# Patient Record
Sex: MALE | Race: WHITE | NOT HISPANIC OR LATINO | Employment: OTHER | URBAN - METROPOLITAN AREA
[De-identification: names, ages, dates, MRNs, and addresses within clinical notes are randomized per-mention and may not be internally consistent; named-entity substitution may affect disease eponyms.]

---

## 2017-02-14 ENCOUNTER — TELEPHONE (OUTPATIENT)
Dept: TRANSPLANT | Facility: CLINIC | Age: 57
End: 2017-02-14

## 2017-02-14 NOTE — TELEPHONE ENCOUNTER
Bradly Bliss MD [place]um, Teresa, RN                   [facility],     Because patient is HLA identical, patient was in the process of tapering off one of his immunosuppressants.  Would recommend increasing mycophenolate mofetil up to 750 mg bid and decreasing tacrolimus in half.     If he does okay, would recommend increasing mycophenolate mofetil to 1000 mg bid and stopping tacrolimus altogether.      Call patient with plan. Instructed to increase mycophenolate mofetil to 750 mg BID from 500 mg BID.  Decrease tacrolimus to 0.5 mg BID from 1.0 mg AM / 0.5 mg PM.    Will recheck levels with transplant labs and drug levels. Faxed lab orders to:  [facility] [phone] (Phone)   [phone] (Fax)

## 2017-02-20 DIAGNOSIS — Z94.0 S/P KIDNEY TRANSPLANT: ICD-10-CM

## 2017-02-20 DIAGNOSIS — Z94.0 KIDNEY REPLACED BY TRANSPLANT: ICD-10-CM

## 2017-02-20 RX ORDER — TACROLIMUS 0.5 MG/1
CAPSULE ORAL
Qty: 90 CAPSULE | Refills: 11 | Status: SHIPPED | OUTPATIENT
Start: 2017-02-20 | End: 2017-03-20

## 2017-02-20 RX ORDER — MYCOPHENOLATE MOFETIL 250 MG/1
500 CAPSULE ORAL EVERY 12 HOURS
Qty: 120 CAPSULE | Refills: 11 | Status: SHIPPED | OUTPATIENT
Start: 2017-02-20 | End: 2017-03-06

## 2017-02-20 NOTE — TELEPHONE ENCOUNTER
Patient state dose change on tacrolimus to 0.5mg bid and dose change on mycophenolate 720mg bid, please confirm doses and send new     Thank you.  Linda Laguerre   [facility]  [phone]

## 2017-03-06 DIAGNOSIS — Z94.0 S/P KIDNEY TRANSPLANT: Primary | ICD-10-CM

## 2017-03-06 RX ORDER — MYCOPHENOLATE MOFETIL 250 MG/1
500 CAPSULE ORAL EVERY 12 HOURS
Qty: 120 CAPSULE | Refills: 11 | Status: SHIPPED | OUTPATIENT
Start: 2017-03-06 | End: 2017-03-20

## 2017-03-07 ENCOUNTER — TELEPHONE (OUTPATIENT)
Dept: PHARMACY | Facility: CLINIC | Age: 57
End: 2017-03-07

## 2017-03-20 DIAGNOSIS — Z94.0 KIDNEY REPLACED BY TRANSPLANT: ICD-10-CM

## 2017-03-20 DIAGNOSIS — Z94.0 S/P KIDNEY TRANSPLANT: Primary | ICD-10-CM

## 2017-03-20 RX ORDER — TACROLIMUS 0.5 MG/1
CAPSULE ORAL
Qty: 90 CAPSULE | Refills: 0 | Status: SHIPPED | OUTPATIENT
Start: 2017-03-20 | End: 2017-03-22

## 2017-03-20 RX ORDER — MYCOPHENOLATE MOFETIL 250 MG/1
500 CAPSULE ORAL EVERY 12 HOURS
Qty: 120 CAPSULE | Refills: 0 | Status: SHIPPED | OUTPATIENT
Start: 2017-03-20 | End: 2017-03-22

## 2017-03-20 NOTE — TELEPHONE ENCOUNTER
Mycophenolate - per patient dose change to 3 capsules in the morning and 3 capsules in the evening (total 6 caps daily).      Tacrolimus - per patient dose change to 1 capsules in the morning and 1 capsule in the evening.

## 2017-03-22 DIAGNOSIS — Z94.0 KIDNEY REPLACED BY TRANSPLANT: ICD-10-CM

## 2017-03-22 DIAGNOSIS — Z94.0 S/P KIDNEY TRANSPLANT: ICD-10-CM

## 2017-03-22 RX ORDER — MYCOPHENOLATE MOFETIL 250 MG/1
750 CAPSULE ORAL EVERY 12 HOURS
Qty: 180 CAPSULE | Refills: 11 | Status: SHIPPED | OUTPATIENT
Start: 2017-03-22 | End: 2018-02-06

## 2017-03-22 RX ORDER — TACROLIMUS 0.5 MG/1
CAPSULE ORAL
Qty: 90 CAPSULE | Refills: 0 | Status: SHIPPED | OUTPATIENT
Start: 2017-03-22 | End: 2017-05-04

## 2017-03-22 NOTE — PROGRESS NOTES
Left VM re: needing Med dose updated in Epic and then send refills to [facility]. --- Prescription updated and sent.  Instructed to call back if having issues.

## 2017-05-04 DIAGNOSIS — Z94.0 S/P KIDNEY TRANSPLANT: Primary | ICD-10-CM

## 2017-05-04 DIAGNOSIS — Z94.0 KIDNEY REPLACED BY TRANSPLANT: ICD-10-CM

## 2017-05-04 RX ORDER — TACROLIMUS 0.5 MG/1
CAPSULE ORAL
Qty: 90 CAPSULE | Refills: 0 | Status: SHIPPED | OUTPATIENT
Start: 2017-05-04 | End: 2017-05-31

## 2017-05-11 ENCOUNTER — TELEPHONE (OUTPATIENT)
Dept: TRANSPLANT | Facility: CLINIC | Age: 57
End: 2017-05-11

## 2017-05-11 NOTE — LETTER
[facility]  Room 2-200  [facility],  [address]  [address]  Tel [phone]  Toll Free [phone]                OUTPATIENT LABORATORY TEST ORDER    Patient Name: Christiano Souza  Transplant Date: 3/20/2014 (Kidney)  YOB: 1960  Issue Date & Time: 5-11- 2017 3:42 PM    [facility] MR:  [id number]  Exp. Date (1 year after date issued)      Diagnoses: Kidney Transplant (ICD-10  Z94.0)   Long term use of medications (ICD-10  Z79.899)     Lab results to be available on the same day drawn.   Patient should release information to the [facility].  Please fax to the Transplant Center at [phone].    Monthly    ?Hemogram and Platelet   ?Basic Metabolic Panel (Sodium, Potassium, Chloride, CO2, Creatinine, BUN, Glucose, Calcium)   ?/Tacrolimus/Prograf drug level (mail to [facility] - mailers and instructions provided by the patient)        Every 6 Months Due:                  ?BK (Polyoma Virus) PCR Quantitative - Plasma                                            ?Urine for protein/creatinine      Yearly:   ? PRA/DSA level (mailers provided by the patient)       HIV, HBsAb, HBcAb, HCV  If you have any questions, please call [facility] at [phone] or [phone].    Please fax labs to [phone]

## 2017-05-11 NOTE — TELEPHONE ENCOUNTER
Call from [facility] needing updated orders. --- LPN tasked.    [facility] [phone] (Phone)  [phone] (Fax)

## 2017-05-31 DIAGNOSIS — Z94.0 KIDNEY REPLACED BY TRANSPLANT: ICD-10-CM

## 2017-05-31 DIAGNOSIS — Z94.0 S/P KIDNEY TRANSPLANT: ICD-10-CM

## 2017-06-01 RX ORDER — TACROLIMUS 0.5 MG/1
CAPSULE ORAL
Qty: 90 CAPSULE | Refills: 11 | Status: SHIPPED | OUTPATIENT
Start: 2017-06-01 | End: 2018-02-06

## 2018-02-06 DIAGNOSIS — Z94.0 KIDNEY REPLACED BY TRANSPLANT: ICD-10-CM

## 2018-02-06 DIAGNOSIS — Z94.0 S/P KIDNEY TRANSPLANT: ICD-10-CM

## 2018-02-06 RX ORDER — SULFAMETHOXAZOLE AND TRIMETHOPRIM 400; 80 MG/1; MG/1
1 TABLET ORAL DAILY
Qty: 180 TABLET | Refills: 0 | Status: SHIPPED | OUTPATIENT
Start: 2018-02-06 | End: 2018-07-25

## 2018-02-06 RX ORDER — TACROLIMUS 0.5 MG/1
CAPSULE ORAL
Qty: 90 CAPSULE | Refills: 0 | Status: SHIPPED | OUTPATIENT
Start: 2018-02-06 | End: 2018-09-08

## 2018-02-06 RX ORDER — MYCOPHENOLATE MOFETIL 250 MG/1
750 CAPSULE ORAL EVERY 12 HOURS
Qty: 180 CAPSULE | Refills: 0 | Status: SHIPPED | OUTPATIENT
Start: 2018-02-06 | End: 2018-03-29

## 2018-02-06 NOTE — TELEPHONE ENCOUNTER
Patient Call: Medication   Patient left a voicemail message on 2/6/18 at 9:45 am stating her would like transfer his perscriptions to an other pharmacy. Patient did not state what pharmacy.

## 2018-02-06 NOTE — TELEPHONE ENCOUNTER
Call returned to patient: Patient request transplant rx sent to [facility] in [facility]. Patient verbalize understanding to schedule annual txp appointment and lab work for future refills

## 2018-02-14 ENCOUNTER — TELEPHONE (OUTPATIENT)
Dept: TRANSPLANT | Facility: CLINIC | Age: 58
End: 2018-02-14

## 2018-02-14 NOTE — TELEPHONE ENCOUNTER
Patient Call: Transplant Lab  Reason for call: patient went in to get his lab drawn today and was not able to due to his order . Need new order.  Lab numbers  Phone [phone]  Fax [phone]

## 2018-02-28 ENCOUNTER — TELEPHONE (OUTPATIENT)
Dept: PHARMACY | Facility: CLINIC | Age: 58
End: 2018-02-28

## 2018-03-29 DIAGNOSIS — Z94.0 S/P KIDNEY TRANSPLANT: ICD-10-CM

## 2018-03-29 DIAGNOSIS — Z94.0 KIDNEY REPLACED BY TRANSPLANT: ICD-10-CM

## 2018-03-29 RX ORDER — MYCOPHENOLATE MOFETIL 250 MG/1
750 CAPSULE ORAL EVERY 12 HOURS
Qty: 180 CAPSULE | Refills: 6 | Status: SHIPPED | OUTPATIENT
Start: 2018-03-29 | End: 2018-11-06

## 2018-03-29 NOTE — TELEPHONE ENCOUNTER
Patient Call: Patient Call: Medication Refill  Route to LPN  Instruct the patient to first contact their pharmacy. If they have called their pharmacy and require further assistance, route to LPN.  Name of Medication: MPA   When will the patient be out of this medication?: Less than 3 days (Route high priority)    Christiano left  03/29/18 AM., was told by (?) no more refill on MPA.,

## 2018-07-25 DIAGNOSIS — Z94.0 S/P KIDNEY TRANSPLANT: Primary | ICD-10-CM

## 2018-07-25 RX ORDER — SULFAMETHOXAZOLE AND TRIMETHOPRIM 400; 80 MG/1; MG/1
1 TABLET ORAL DAILY
Qty: 90 TABLET | Refills: 0 | Status: SHIPPED | OUTPATIENT
Start: 2018-07-25 | End: 2019-01-22

## 2018-09-08 DIAGNOSIS — Z94.0 S/P KIDNEY TRANSPLANT: ICD-10-CM

## 2018-09-08 DIAGNOSIS — Z94.0 KIDNEY REPLACED BY TRANSPLANT: ICD-10-CM

## 2018-09-10 RX ORDER — TACROLIMUS 0.5 MG/1
CAPSULE ORAL
Qty: 90 CAPSULE | Refills: 0 | Status: SHIPPED | OUTPATIENT
Start: 2018-09-10 | End: 2018-11-30

## 2018-09-17 ENCOUNTER — TELEPHONE (OUTPATIENT)
Dept: NEPHROLOGY | Facility: CLINIC | Age: 58
End: 2018-09-17

## 2018-09-17 NOTE — TELEPHONE ENCOUNTER
Spoke with patient for transplant follow up. States he's doing well, no symptoms or concerns. Denied questions prior to appointment.    Holli Starr RN

## 2018-09-26 ENCOUNTER — TELEPHONE (OUTPATIENT)
Dept: TRANSPLANT | Facility: CLINIC | Age: 58
End: 2018-09-26

## 2018-09-26 ENCOUNTER — DOCUMENTATION ONLY (OUTPATIENT)
Dept: TRANSPLANT | Facility: CLINIC | Age: 58
End: 2018-09-26

## 2018-09-26 DIAGNOSIS — Z94.0 IMMUNOSUPPRESSIVE MANAGEMENT ENCOUNTER FOLLOWING KIDNEY TRANSPLANT: ICD-10-CM

## 2018-09-26 DIAGNOSIS — Z48.298 AFTERCARE FOLLOWING ORGAN TRANSPLANT: ICD-10-CM

## 2018-09-26 DIAGNOSIS — T86.10 COMPLICATIONS, KIDNEY TRANSPLANT: ICD-10-CM

## 2018-09-26 DIAGNOSIS — Z79.899 IMMUNOSUPPRESSIVE MANAGEMENT ENCOUNTER FOLLOWING KIDNEY TRANSPLANT: ICD-10-CM

## 2018-09-26 DIAGNOSIS — Z94.0 KIDNEY TRANSPLANTED: Primary | ICD-10-CM

## 2018-09-26 NOTE — PROGRESS NOTES
Chart Prep    Clinic Visit on: 10/1/18    Last lab completed: 9/24/18    Lab letter updated: 2/14/18    Lab orders up to date in Epic.

## 2018-09-26 NOTE — TELEPHONE ENCOUNTER
ISSUE:  Tacrolimus 3.1  Goal 4-6    OUTCOME:   Spoke with pt regarding drug levels.  Pt verbalized it was ~10 hour trough and verbalized his current tacrolimus dose is 0.5 mg BID.  Pt has been on this dose since 2/2017.  Prior level was at goal (5/2017).  Pt aware no dose change at this time.  He will repeat drug level next week.  Pt aware if level still low, we will increase his dose.   Order sent    Pt questions answered, pt v/u.

## 2018-09-26 NOTE — LETTER
PHYSICIAN ORDERS      DATE & TIME ISSUED: 2018 9:39 AM  PATIENT NAME: Christiano Souza   : 1960     [facility] MR# [if applicable]: [id number]     DIAGNOSIS:  Kidney Transplant  ICD-10 CODE: z 94.0     Please draw the following lab in 1 week  Tacrolimus drug level (12 hour trough)    Any questions please call: TOBY barboza  [place] [facility] [phone]  Please fax result to [phone].    .

## 2018-10-01 ENCOUNTER — OFFICE VISIT (OUTPATIENT)
Dept: NEPHROLOGY | Facility: CLINIC | Age: 58
End: 2018-10-01
Attending: INTERNAL MEDICINE

## 2018-10-01 VITALS
WEIGHT: 178.2 LBS | SYSTOLIC BLOOD PRESSURE: 114 MMHG | BODY MASS INDEX: 28.64 KG/M2 | TEMPERATURE: 98.5 F | HEART RATE: 75 BPM | DIASTOLIC BLOOD PRESSURE: 76 MMHG | HEIGHT: 66 IN | OXYGEN SATURATION: 95 %

## 2018-10-01 DIAGNOSIS — D84.9 IMMUNOSUPPRESSED STATUS (H): ICD-10-CM

## 2018-10-01 DIAGNOSIS — E55.9 VITAMIN D DEFICIENCY: ICD-10-CM

## 2018-10-01 DIAGNOSIS — I10 ESSENTIAL HYPERTENSION: ICD-10-CM

## 2018-10-01 DIAGNOSIS — Z94.0 KIDNEY REPLACED BY TRANSPLANT: Primary | ICD-10-CM

## 2018-10-01 DIAGNOSIS — D15.1 ATRIAL MYXOMA: ICD-10-CM

## 2018-10-01 PROCEDURE — G0463 HOSPITAL OUTPT CLINIC VISIT: HCPCS | Mod: ZF

## 2018-10-01 ASSESSMENT — PAIN SCALES - GENERAL: PAINLEVEL: NO PAIN (0)

## 2018-10-01 NOTE — NURSING NOTE
"Chief Complaint   Patient presents with     RECHECK     kidney tx      /76  Pulse 75  Temp 98.5  F (36.9  C) (Oral)  Ht 1.676 m (5' 6\")  Wt 80.8 kg (178 lb 3.2 oz)  SpO2 95%  BMI 28.76 kg/m2   Amie Lechuga, ERICK    "

## 2018-10-01 NOTE — PROGRESS NOTES
CHRONIC TRANSPLANT NEPHROLOGY VISIT    Assessment & Plan   # LDKT: basline Cr ~ 1.2- 1.4; Stable   - Proteinuria: Normal   - Latest DSA: No Date of DSA last checked: 3/2014   - BK: No   - Kidney Tx Biopsy: No    # Immunosuppression: Tacrolimus immediate release (goal  3-5) and Mycophenolate mofetil (goal  1-3.5)   - Changes: No- received allograft from identical brother. Was not type for class 2 HLA. Requested from immuno lab but his Class 1 HLA labs are identical. With his h/o IgA - he will continue to benefit from current dual IS. His prograf levels have been lowered to 3-5 due to his likely HLA identical match.     # Prophylaxis:   - PJP: TMP/Sulfa (Bactrim)    # Hypertension: Controlled; Goal BP: 130/80   - Changes: No    # Anemia in chronic renal disease: Hgb: Stable   - Iron studies: Not checked recently but hgb normal    # Mineral Bone Disorder:   - Secondary renal hyperparathyroidism; PTH level is:  not checked recently but was less then 100 post transplant.   - Vitamin D; level is:  not checked recently- will recheck  - Calcium; level is:  normal    # Electrolytes:   - Potassium; level: normal  - Magnesium; level: not checked recently    # Skin Cancer:   - New lesions: none   - Discussed sun protection and recommend regular follow up with Dermatology    # Elevated blood glucose: 109 but patient states last labs were not fasting. Will monitor.     # Chronic cough: Work up has been unremarkable so far.  Would consider Pulmonary referral, which patient would like to do in [place].  Will defer to his PCP to arrange.  Will also need repeat of his ECHO for his h/o atrial myxoma.     Return visit: Return in about 1 year (around 10/1/2019).    # Transplant History:  Etiology of kidney failure: IgA nephropathy  Tx: LDKT  Transplant: 3/20/2014 (Kidney)  Donor Type: Living Donor Class: Standard Criteria Donor  Crossmatch at time of Tx: negative  DSA at time of Tx: No  History of BK viremia: No  History of CMV viremia:  No  History of EBV viremia: No  Significant changes in immunosuppression: None  Significant Complications: None    Transplant Office Phone Number: [phone]    Assessment and plan was discussed with the patient and he voiced his understanding and agreement.    Gutsavo Turner MD     Attestation:  This patient has been seen and evaluated by me, Bradly Bliss MD.  I have reviewed the note and agree with plan of care as documented by the fellow.       Chief Complaint   Mr. Souza is a 58 year old here for routine follow up.    History of Present Illness    Christiano Souza is a 58 year old male [facility] and is status post LDKT on HLA identical from his brother on 3/20/14.     He was last seen in clinic on 10/10/2016. He has not had any major illnesses or hospitalization. She has no complaints today. But ROS positive for chronic cough since the last one year. Sometimes productive- mostly white and sometimes a tinge of yellow. No fever, night sweats. Appetite is good. Has lost weight intentionally. No nausea, vomiting or diarrhea.  No fever, sweats or chills.  No leg swelling.  No pain or burning with urination.      Recent Hospitalizations:  [x] No [] Yes    New Medical Issues: [x] No [] Yes    Decreased energy: [x] No [] Yes    Chest pain or SOB with exertion:  [x] No [] Yes    Appetite change or weight change: [x] No [] Yes    Nausea, vomiting or diarrhea:  [x] No [] Yes    Fever, sweats or chills: [x] No [] Yes    Leg swelling: [x] No [] Yes      Other medical issues:  No but see SOB in the HPI    Home BP: 120s/90s    Review of Systems   A comprehensive review of systems was obtained and negative, except as noted in the HPI or PMH.    Problem List   Patient Active Problem List   Diagnosis     Hypertension     Dyslipidemia     Nephrolithiasis     Lupus anticoagulant positive     Atrial myxoma     Kidney replaced by transplant     Immunosuppressed status (H)       Social  "History   Social History   Substance Use Topics     Smoking status: Never Smoker     Smokeless tobacco: Never Used     Alcohol use 1.5 oz/week     3 drink(s) per week       Allergies   No Known Allergies    Medications   Current Outpatient Prescriptions   Medication Sig     metoprolol (LOPRESSOR) 50 MG tablet Take 3 tablets (150 mg) by mouth 2 times daily (Patient taking differently: Take 50 mg by mouth 2 times daily Patient taking two tab in the am and two tab in the pm (100 mg total))     mycophenolate (GENERIC EQUIVALENT) 250 MG capsule Take 3 capsules (750 mg) by mouth every 12 hours     Omega-3 Fatty Acids (FISH OIL) 1000 MG CPDR Take 2 capsules by mouth daily     omeprazole (PRILOSEC) 20 MG capsule Take 40 mg by mouth daily      sulfamethoxazole-trimethoprim (BACTRIM/SEPTRA) 400-80 MG per tablet Take 1 tablet by mouth daily     tacrolimus (GENERIC EQUIVALENT) 0.5 MG capsule TAKE TWO CAPSULES BY MOUTH IN THE MORNING AND ONE CAPSULE IN THE EVENING - TOTAL DOSE = 1 MG IN THE MORNING AND 0.5 MG IN THE EVENING      No current facility-administered medications for this visit.      There are no discontinued medications.    Physical Exam   Vital Signs: /76  Pulse 75  Temp 98.5  F (36.9  C) (Oral)  Ht 1.676 m (5' 6\")  Wt 80.8 kg (178 lb 3.2 oz)  SpO2 95%  BMI 28.76 kg/m2    GENERAL APPEARANCE: alert and no distress  HENT: mouth without ulcers or lesions  LYMPHATICS: no cervical or supraclavicular nodes  RESP: lungs clear to auscultation - no rales, rhonchi or wheezes  CV: regular rhythm, normal rate, no rub, no murmur  EDEMA: no LE edema bilaterally  ABDOMEN: soft, nondistended, nontender, bowel sounds normal  MS: extremities normal - no gross deformities noted, no evidence of inflammation in joints, no muscle tenderness  SKIN: no rash  TX KIDNEY: normal  DIALYSIS ACCESS:  None      Data     Renal Latest Ref Rng & Units 9/24/2018 2/15/2018 5/11/2017   Na 133 - 144 mmol/L - - -   Na (external) 136 - 145 " MMOL/L 139 138 140   K 3.4 - 5.3 mmol/L - - -   K (external) 3.5 - 5.1 MMOL/L 4.0 4.1 3.8   Cl 94 - 109 mmol/L - - -   Cl (external) 98 - 107 MMOL/L 107 105 108(H)   CO2 20 - 32 mmol/L - - -   CO2 (external) 21 - 32 MMOL/L 23 22 21   BUN 7 - 30 mg/dL - - -   BUN (external) 7 - 18 MG/DL 17 17 17   Cr 0.66 - 1.25 mg/dL - - -   Cr (external) 0.6 - 1.3 MG/DL 1.20 1.20 1.30   Glucose 60 - 99 mg/dL - - -   Glucose (external) 70 - 100 MG/(H) 127(H) 101   Ca  8.5 - 10.4 mg/dL - - -   Ca (external) 8.5 - 10.1 MG/DL 8.7 8.2(L) 8.7   Mg 1.6 - 2.3 mg/dL - - -   Mg (external) 1.8 - 2.4 MG/DL - - -     Bone Health Latest Ref Rng & Units 4/14/2014 3/27/2014 3/24/2014   Phos 2.5 - 4.5 mg/dL 3.5 3.2 2.8   PTHi 12 - 72 pg/mL - 79(H) -   Vit D Def 30 - 75 ug/L - 28(L) -     Heme Latest Ref Rng & Units 9/24/2018 2/15/2018 5/11/2017   WBC 4.0 - 11.0 10e9/L - - -   WBC (external) 3.50 - 11.00 K/UL 7.00 6.10 4.75   Hgb 13.3 - 17.7 g/dL - - -   Hgb (external) 14.0 - 18.0 g/dL 15.7 15.6 14.3   Plt 150 - 450 10e9/L - - -   Plt (external) 150 - 400 K/ 221 225     Liver Latest Ref Rng & Units 2/15/2018 3/31/2017 6/28/2016   AP 40 - 150 U/L - - -   AP (external) 50 - 136 U/L 65 80 67   TBili 0.2 - 1.3 mg/dL - - -   TBili (external) 0.2 - 1.0 MG/DL 0.4 0.6 0.5   ALT 0 - 70 U/L - - -   ALT (external) 16 - 61 U/L 31 33 43   AST 0 - 45 U/L - - -   AST (external) 15 - 37 U/L 25 17 24   Tot Protein 6.8 - 8.8 g/dL - - -   Tot Protein (external) 6.4 - 8.2 G/DL 7.3 7.3 7.1   Albumin 3.9 - 5.1 g/dL - - -   Albumin (external) 3.4 - 5.0 G/DL 4.0 4.0 3.9     No flowsheet data found.  Iron studies Latest Ref Rng & Units 3/27/2014   Iron 35 - 180 ug/dL 43   Iron sat 15 - 46 % 14(L)     [facility] T[facility] Virology Latest Ref Rng & Units 2/15/2018 3/31/2017 3/8/2016   BK Quant Log Ext log copies/mL <2.6 <2.6 <2.6   BK Quant Result Ext Copies/mL <390 <390 <390   BK Quant Spec Ext - BLOOD BLOOD -   Hep B Core NEG - - -   Hep B Core Ext Negative - - -   Hep B  Surf - - - -   HIV 1&2 NEG - - -     No flowsheet data found.    Recent Labs   Lab Test  04/14/14   1123  04/14/14   1025  04/14/14   0927   DOSTAC  4/14/14   0715  4/14/14    33246  Not Provided   TACROL  5.1  5.6  6.0     Recent Labs   Lab Test  04/14/14   1123  04/14/14   1025  04/14/14   0927   DOSMPA  4/14/14   0715  4/14/14   0715  Not Provided   MPACID  3.76*  3.28  8.60*   MPAG  129.1*  159.6*  157.2*

## 2018-10-01 NOTE — MR AVS SNAPSHOT
After Visit Summary   10/1/2018    Christiano Souza    MRN: [id number]           Patient Information     Date Of Birth          1960        Visit Information        Provider Department      10/1/2018 1:55 PM 1,  Kidney/Pancreas Recipient [facility] Nephrology        Today's Diagnoses     Kidney replaced by transplant    -  1    Immunosuppressed status (H)        Atrial myxoma        Essential hypertension        Vitamin D deficiency           Follow-ups after your visit        Follow-up notes from your care team     Return in about 1 year (around 10/1/2019).      Your next 10 appointments already scheduled     Sep 27, 2019  1:05 PM CDT   (Arrive by 12:35 PM)   Return Kidney Transplant with  Kidney/Pancreas Recipient 1   [facility]ph[facility] ([facility] [place])    [address]  [address]  [facility] [address] [phone]              Who to contact     If you have questions or need follow up information about today's clinic visit or your schedule please contact [facility] NE[facility] directly at [phone].  Normal or non-critical lab and imaging results will be communicated to you by [employer], letter or phone within 4 business days after the clinic has received the results. If you do not hear from us within 7 days, please contact the clinic through [employer] or phone. If you have a critical or abnormal lab result, we will notify you by phone as soon as possible.  Submit refill requests through [employer] or call your pharmacy and they will forward the refill request to us. Please allow 3 business days for your refill to be completed.          Additional Information About Your Visit        [employer] Information     [employer] gives you secure access to your electronic health record. If you see a primary care provider, you can also send messages to your care team and make appointments. If you have questions, please call your primary care clinic.  If you do not have a primary  "care provider, please call [phone] and they will assist you.        Care EveryWhere ID     This is your Care EveryWhere ID. This could be used by other organizations to access your [facility] medical records  [id number]        Your Vitals Were     Pulse Temperature Height Pulse Oximetry BMI (Body Mass Index)       75 98.5  F (36.9  C) (Oral) 1.676 m (5' 6\") 95% 28.76 kg/m2        Blood Pressure from Last 3 Encounters:   10/01/18 114/76   10/10/16 114/73   03/16/15 140/89    Weight from Last 3 Encounters:   10/01/18 80.8 kg (178 lb 3.2 oz)   10/10/16 85.1 kg (187 lb 9.6 oz)   03/16/15 86.6 kg (190 lb 14.4 oz)              Today, you had the following     No orders found for display         Today's Medication Changes          These changes are accurate as of 10/1/18  5:45 PM.  If you have any questions, ask your nurse or doctor.               These medicines have changed or have updated prescriptions.        Dose/Directions    metoprolol tartrate 50 MG tablet   Commonly known as:  LOPRESSOR   This may have changed:    - how much to take  - additional instructions   Used for:  S/P kidney transplant        Dose:  150 mg   Take 3 tablets (150 mg) by mouth 2 times daily   Quantity:  180 tablet   Refills:  5                Primary Care Provider Office Phone # Fax #    Oscar Flowers MD, [phone]       [facility] [address] [address]        Equal Access to Services     SAUNDRA ESPINOZA AH: Hadii gertrude ku hadasho Soomaali, waaxda luqadaha, qaybta kaalmada adeegyada, lm malave. So [facility] [phone].    ATENCIÓN: Si habla español, tiene a ohara disposición servicios gratuitos de asistencia lingüística. Llame al [phone].    We comply with applicable federal civil rights laws and [place] laws. We do not discriminate on the basis of race, color, national origin, age, disability, sex, sexual orientation, or gender identity.            Thank you!     " Thank you for choosing [facility] NEPHROLOG[facility]  for your care. Our goal is always to provide you with excellent care. Hearing back from our patients is one way we can continue to improve our services. Please take a few minutes to complete the written survey that you may receive in the mail after your visit with us. Thank you!             Your Updated Medication List - Protect others around you: Learn how to safely use, store and throw away your medicines at www.disposemymeds.org.          This list is accurate as of 10/1/18  5:45 PM.  Always use your most recent med list.                   Brand Name Dispense Instructions for use Diagnosis    Fish Oil 1000 MG Cpdr      Take 2 capsules by mouth daily        metoprolol tartrate 50 MG tablet    LOPRESSOR    180 tablet    Take 3 tablets (150 mg) by mouth 2 times daily    S/P kidney transplant       mycophenolate 250 MG capsule    GENERIC EQUIVALENT    180 capsule    Take 3 capsules (750 mg) by mouth every 12 hours    S/P kidney transplant, Kidney replaced by transplant       omeprazole 20 MG CR capsule    priLOSEC     Take 40 mg by mouth daily        sulfamethoxazole-trimethoprim 400-80 MG per tablet    BACTRIM/SEPTRA    90 tablet    Take 1 tablet by mouth daily    S/P kidney transplant       tacrolimus 0.5 MG capsule    GENERIC EQUIVALENT    90 capsule    TAKE TWO CAPSULES BY MOUTH IN THE MORNING AND ONE CAPSULE IN THE EVENING - TOTAL DOSE = 1 MG IN THE MORNING AND 0.5 MG IN THE EVENING    Kidney replaced by transplant, S/P kidney transplant

## 2018-10-01 NOTE — LETTER
10/1/2018      RE: Christiano CEDILLO Libby  [address] [place] [address]       CHRONIC TRANSPLANT NEPHROLOGY VISIT    Assessment & Plan   # LDKT: basline Cr ~ 1.2- 1.4; Stable   - Proteinuria: Normal   - Latest DSA: No Date of DSA last checked: 3/2014   - BK: No   - Kidney Tx Biopsy: No    # Immunosuppression: Tacrolimus immediate release (goal  3-5) and Mycophenolate mofetil (goal  1-3.5)   - Changes: No- received allograft from identical brother. Was not type for class 2 HLA. Requested from immuno lab but his Class 1 HLA labs are identical. With his h/o IgA - he will continue to benefit from current dual IS. His prograf levels have been lowered to 3-5 due to his likely HLA identical match.     # Prophylaxis:   - PJP: TMP/Sulfa (Bactrim)    # Hypertension: Controlled; Goal BP: 130/80   - Changes: No    # Anemia in chronic renal disease: Hgb: Stable   - Iron studies: Not checked recently but hgb normal    # Mineral Bone Disorder:   - Secondary renal hyperparathyroidism; PTH level is:  not checked recently but was less then 100 post transplant.   - Vitamin D; level is:  not checked recently- will recheck  - Calcium; level is:  normal    # Electrolytes:   - Potassium; level: normal  - Magnesium; level: not checked recently    # Skin Cancer:   - New lesions: none   - Discussed sun protection and recommend regular follow up with Dermatology    # Elevated blood glucose: 109 but patient states last labs were not fasting. Will monitor.     # Chronic cough: Work up has been unremarkable so far.  Would consider Pulmonary referral, which patient would like to do in [place].  Will defer to his PCP to arrange.  Will also need repeat of his ECHO for his h/o atrial myxoma.     Return visit: Return in about 1 year (around 10/1/2019).    # Transplant History:  Etiology of kidney failure: IgA nephropathy  Tx: LDKT  Transplant: 3/20/2014 (Kidney)  Donor Type: Living Donor Class: Standard Criteria Donor  Crossmatch at time of Tx:  negative  DSA at time of Tx: No  History of BK viremia: No  History of CMV viremia: No  History of EBV viremia: No  Significant changes in immunosuppression: None  Significant Complications: None    Transplant Office Phone Number: [phone]    Assessment and plan was discussed with the patient and he voiced his understanding and agreement.    Gustavo Turner MD     Attestation:  This patient has been seen and evaluated by me, Bradly Bliss MD.  I have reviewed the note and agree with plan of care as documented by the fellow.       Chief Complaint   Mr. Souza is a 58 year old here for routine follow up.    History of Present Illness    Christiano Souza is a 58 year old male [facility] and is status post LDKT on HLA identical from his brother on 3/20/14.     He was last seen in clinic on 10/10/2016. He has not had any major illnesses or hospitalization. She has no complaints today. But ROS positive for chronic cough since the last one year. Sometimes productive- mostly white and sometimes a tinge of yellow. No fever, night sweats. Appetite is good. Has lost weight intentionally. No nausea, vomiting or diarrhea.  No fever, sweats or chills.  No leg swelling.  No pain or burning with urination.      Recent Hospitalizations:  [x] No [] Yes    New Medical Issues: [x] No [] Yes    Decreased energy: [x] No [] Yes    Chest pain or SOB with exertion:  [x] No [] Yes    Appetite change or weight change: [x] No [] Yes    Nausea, vomiting or diarrhea:  [x] No [] Yes    Fever, sweats or chills: [x] No [] Yes    Leg swelling: [x] No [] Yes      Other medical issues:  No but see SOB in the HPI    Home BP: 120s/90s    Review of Systems   A comprehensive review of systems was obtained and negative, except as noted in the HPI or PMH.    Problem List   Patient Active Problem List   Diagnosis     Hypertension     Dyslipidemia     Nephrolithiasis     Lupus anticoagulant positive     Atrial  "myxoma     Kidney replaced by transplant     Immunosuppressed status (H)       Social History   Social History   Substance Use Topics     Smoking status: Never Smoker     Smokeless tobacco: Never Used     Alcohol use 1.5 oz/week     3 drink(s) per week       Allergies   No Known Allergies    Medications   Current Outpatient Prescriptions   Medication Sig     metoprolol (LOPRESSOR) 50 MG tablet Take 3 tablets (150 mg) by mouth 2 times daily (Patient taking differently: Take 50 mg by mouth 2 times daily Patient taking two tab in the am and two tab in the pm (100 mg total))     mycophenolate (GENERIC EQUIVALENT) 250 MG capsule Take 3 capsules (750 mg) by mouth every 12 hours     Omega-3 Fatty Acids (FISH OIL) 1000 MG CPDR Take 2 capsules by mouth daily     omeprazole (PRILOSEC) 20 MG capsule Take 40 mg by mouth daily      sulfamethoxazole-trimethoprim (BACTRIM/SEPTRA) 400-80 MG per tablet Take 1 tablet by mouth daily     tacrolimus (GENERIC EQUIVALENT) 0.5 MG capsule TAKE TWO CAPSULES BY MOUTH IN THE MORNING AND ONE CAPSULE IN THE EVENING - TOTAL DOSE = 1 MG IN THE MORNING AND 0.5 MG IN THE EVENING      No current facility-administered medications for this visit.      There are no discontinued medications.    Physical Exam   Vital Signs: /76  Pulse 75  Temp 98.5  F (36.9  C) (Oral)  Ht 1.676 m (5' 6\")  Wt 80.8 kg (178 lb 3.2 oz)  SpO2 95%  BMI 28.76 kg/m2    GENERAL APPEARANCE: alert and no distress  HENT: mouth without ulcers or lesions  LYMPHATICS: no cervical or supraclavicular nodes  RESP: lungs clear to auscultation - no rales, rhonchi or wheezes  CV: regular rhythm, normal rate, no rub, no murmur  EDEMA: no LE edema bilaterally  ABDOMEN: soft, nondistended, nontender, bowel sounds normal  MS: extremities normal - no gross deformities noted, no evidence of inflammation in joints, no muscle tenderness  SKIN: no rash  TX KIDNEY: normal  DIALYSIS ACCESS:  None      Data     Renal Latest Ref Rng & Units " 9/24/2018 2/15/2018 5/11/2017   Na 133 - 144 mmol/L - - -   Na (external) 136 - 145 MMOL/L 139 138 140   K 3.4 - 5.3 mmol/L - - -   K (external) 3.5 - 5.1 MMOL/L 4.0 4.1 3.8   Cl 94 - 109 mmol/L - - -   Cl (external) 98 - 107 MMOL/L 107 105 108(H)   CO2 20 - 32 mmol/L - - -   CO2 (external) 21 - 32 MMOL/L 23 22 21   BUN 7 - 30 mg/dL - - -   BUN (external) 7 - 18 MG/DL 17 17 17   Cr 0.66 - 1.25 mg/dL - - -   Cr (external) 0.6 - 1.3 MG/DL 1.20 1.20 1.30   Glucose 60 - 99 mg/dL - - -   Glucose (external) 70 - 100 MG/(H) 127(H) 101   Ca  8.5 - 10.4 mg/dL - - -   Ca (external) 8.5 - 10.1 MG/DL 8.7 8.2(L) 8.7   Mg 1.6 - 2.3 mg/dL - - -   Mg (external) 1.8 - 2.4 MG/DL - - -     Bone Health Latest Ref Rng & Units 4/14/2014 3/27/2014 3/24/2014   Phos 2.5 - 4.5 mg/dL 3.5 3.2 2.8   PTHi 12 - 72 pg/mL - 79(H) -   Vit D Def 30 - 75 ug/L - 28(L) -     Heme Latest Ref Rng & Units 9/24/2018 2/15/2018 5/11/2017   WBC 4.0 - 11.0 10e9/L - - -   WBC (external) 3.50 - 11.00 K/UL 7.00 6.10 4.75   Hgb 13.3 - 17.7 g/dL - - -   Hgb (external) 14.0 - 18.0 g/dL 15.7 15.6 14.3   Plt 150 - 450 10e9/L - - -   Plt (external) 150 - 400 K/ 221 225     Liver Latest Ref Rng & Units 2/15/2018 3/31/2017 6/28/2016   AP 40 - 150 U/L - - -   AP (external) 50 - 136 U/L 65 80 67   TBili 0.2 - 1.3 mg/dL - - -   TBili (external) 0.2 - 1.0 MG/DL 0.4 0.6 0.5   ALT 0 - 70 U/L - - -   ALT (external) 16 - 61 U/L 31 33 43   AST 0 - 45 U/L - - -   AST (external) 15 - 37 U/L 25 17 24   Tot Protein 6.8 - 8.8 g/dL - - -   Tot Protein (external) 6.4 - 8.2 G/DL 7.3 7.3 7.1   Albumin 3.9 - 5.1 g/dL - - -   Albumin (external) 3.4 - 5.0 G/DL 4.0 4.0 3.9     No flowsheet data found.  Iron studies Latest Ref Rng & Units 3/27/2014   Iron 35 - 180 ug/dL 43   Iron sat 15 - 46 % 14(L)     [facility] Txp Virology Latest Ref Rng & Units 2/15/2018 3/31/2017 3/8/2016   BK Quant Log Ext log copies/mL <2.6 <2.6 <2.6   BK Quant Result Ext Copies/mL <390 <390 <390   BK Quant Spec  Ext - BLOOD BLOOD -   Hep B Core NEG - - -   Hep B Core Ext Negative - - -   Hep B Surf - - - -   HIV 1&2 NEG - - -     No flowsheet data found.    Recent Labs   Lab Test  04/14/14   1123  04/14/14   1025  04/14/14   0927   DOSTAC  4/14/14   0715  4/14/14    [id number]  Not Provided   TACROL  5.1  5.6  6.0     Recent Labs   Lab Test  04/14/14   1123  04/14/14   1025  04/14/14   0927   DOSMPA  4/14/14   0715  4/14/14   0715  Not Provided   MPACID  3.76*  3.28  8.60*   MPAG  129.1*  159.6*  157.2*       Bradly Bliss MD

## 2018-10-01 NOTE — TELEPHONE ENCOUNTER
Melania Diaz RN Etcheverry, Katherine, RN Hey goal on tac is 3.5. They would like a vitamin D level on him with next lab check.       Call placed to pt, no answer.  Left detailed message for pt letting him know we will check Vit D with next set of transplant labs.      LPN TASK:  Please send order for vit D to pt outside lab.

## 2018-11-06 DIAGNOSIS — Z94.0 KIDNEY REPLACED BY TRANSPLANT: Primary | ICD-10-CM

## 2018-11-06 DIAGNOSIS — Z94.0 S/P KIDNEY TRANSPLANT: ICD-10-CM

## 2018-11-06 RX ORDER — MYCOPHENOLATE MOFETIL 250 MG/1
750 CAPSULE ORAL 2 TIMES DAILY
Qty: 180 CAPSULE | Refills: 11 | Status: SHIPPED | OUTPATIENT
Start: 2018-11-06 | End: 2019-11-08

## 2018-11-30 DIAGNOSIS — Z94.0 S/P KIDNEY TRANSPLANT: ICD-10-CM

## 2018-11-30 DIAGNOSIS — Z94.0 KIDNEY REPLACED BY TRANSPLANT: ICD-10-CM

## 2018-11-30 RX ORDER — TACROLIMUS 0.5 MG/1
CAPSULE ORAL
Qty: 90 CAPSULE | Refills: 11 | Status: SHIPPED | OUTPATIENT
Start: 2018-11-30 | End: 2019-12-06

## 2019-01-22 DIAGNOSIS — Z94.0 S/P KIDNEY TRANSPLANT: Primary | ICD-10-CM

## 2019-01-22 NOTE — LETTER
[facility]  Room 2-200  [facility],  [address]  [address]  Tel [phone]  Toll Free [phone]                OUTPATIENT LABORATORY TEST ORDER    Patient Name: Christiano Souza  Transplant Date: 3/20/2014 (Kidney)  YOB: 1960  Issue Date & Time: 1- 0929a   [facility] MR:  [id number]  Exp. Date (1 year after date issued)      Diagnoses: Kidney Transplant (ICD-10  Z94.0)   Long term use of medications (ICD-10  Z79.899)     Lab results to be available on the same day drawn.   Patient should release information to the [facility], [facility].  Please fax to the Transplant Center at [phone].    Monthly    ?Hemogram and Platelet   ?Basic Metabolic Panel (Sodium, Potassium, Chloride, CO2, Creatinine, BUN, Glucose, Calcium)   ?/Tacrolimus/Prograf drug level (mail to [facility] - mailers and instructions provided by the patient)                          Every 6 Months                   ?BK (Polyoma Virus) PCR Quantitative - Plasma                                            ?Urine for protein/creatinine   ? PRA/DSA level (mailers provided by the patient)        If you have any questions, please call [facility] at [phone] or [phone].    Please fax labs to [phone]

## 2019-01-23 RX ORDER — SULFAMETHOXAZOLE AND TRIMETHOPRIM 400; 80 MG/1; MG/1
1 TABLET ORAL DAILY
Qty: 90 TABLET | Refills: 1 | Status: SHIPPED | OUTPATIENT
Start: 2019-01-23 | End: 2019-07-30

## 2019-01-23 NOTE — TELEPHONE ENCOUNTER
ISSUE:  Refill request. Pt needs updated transplant labs.     OUTCOME:   Call placed to pt.  Pt reports he will go in for labs within the next 1-2 weeks.  Pt aware he needs transplant labs every other month.   Pt questions answered, pt v/u.     LPN TASK:  Please send updated standing orders to pt local lab.

## 2019-07-30 DIAGNOSIS — Z94.0 S/P KIDNEY TRANSPLANT: Primary | ICD-10-CM

## 2019-07-30 RX ORDER — SULFAMETHOXAZOLE AND TRIMETHOPRIM 400; 80 MG/1; MG/1
1 TABLET ORAL DAILY
Qty: 90 TABLET | Refills: 0 | Status: SHIPPED | OUTPATIENT
Start: 2019-07-30 | End: 2019-11-08

## 2019-09-26 ENCOUNTER — OFFICE VISIT (OUTPATIENT)
Dept: NEPHROLOGY | Facility: CLINIC | Age: 59
End: 2019-09-26
Attending: INTERNAL MEDICINE

## 2019-09-26 VITALS
OXYGEN SATURATION: 96 % | BODY MASS INDEX: 28.85 KG/M2 | HEART RATE: 74 BPM | SYSTOLIC BLOOD PRESSURE: 125 MMHG | HEIGHT: 66 IN | DIASTOLIC BLOOD PRESSURE: 78 MMHG | WEIGHT: 179.5 LBS

## 2019-09-26 DIAGNOSIS — I15.1 HTN, KIDNEY TRANSPLANT RELATED: ICD-10-CM

## 2019-09-26 DIAGNOSIS — D84.9 IMMUNOSUPPRESSION (H): ICD-10-CM

## 2019-09-26 DIAGNOSIS — Z94.0 STATUS POST KIDNEY TRANSPLANT: Primary | ICD-10-CM

## 2019-09-26 DIAGNOSIS — Z29.89 NEED FOR PNEUMOCYSTIS PROPHYLAXIS: ICD-10-CM

## 2019-09-26 DIAGNOSIS — Z94.0 HTN, KIDNEY TRANSPLANT RELATED: ICD-10-CM

## 2019-09-26 DIAGNOSIS — Z12.83 SKIN CANCER SCREENING: ICD-10-CM

## 2019-09-26 PROCEDURE — G0463 HOSPITAL OUTPT CLINIC VISIT: HCPCS | Mod: ZF

## 2019-09-26 ASSESSMENT — PAIN SCALES - GENERAL: PAINLEVEL: NO PAIN (0)

## 2019-09-26 ASSESSMENT — MIFFLIN-ST. JEOR: SCORE: 1571.96

## 2019-09-26 NOTE — PROGRESS NOTES
CHRONIC TRANSPLANT NEPHROLOGY VISIT    Assessment & Plan   # LDKT: Stable   - Baseline Cr ~ 1.2-1.4; 1.35 as of 9/3   - Proteinuria: Normal (<0.2 grams)   - Date DSA Last Checked: Not Known       - BK Viremia: No   - Kidney Tx Biopsy: No    The patient has excellent allograft function with baseline creatinine 1.2-1.4 and most recent 1.35 mg/dL this month.  The patient has no proteinuria.  He has no donor specific antibody.  He did state that he received a kidney from his identical twin brother.  If this were the case I think it would be reasonable actually to decrease the patient's immunosuppression and get on just a low dose of CellCept.  I will discuss this with our HLA lab.    # Immunosuppression: Tacrolimus immediate release (goal 3-5) and Mycophenolate mofetil (goal 1-3.5)   - Changes: No    - Patient received a kidney from his genetically identical twin brother. Will consider lower immunosuppression.     # Prophylaxis:   - PJP: Sulfa/TMP (Bactrim)    # Hypertension: Controlled; Goal BP: < 130/80   - Changes: No    # Anemia in Chronic Renal Disease: Hgb: Stable        - Iron studies: Not checked recently    # Mineral Bone Disorder:   - Secondary renal hyperparathyroidism; PTH level: Not checked recently  - Vitamin D; level: Not checked recently  - Calcium; level: Normal  - Phosphorus; level: Not checked recently    # Electrolytes:   - Potassium; level: Normal  - Magnesium; level: Not checked recently  - Bicarbonate; level: Normal  - Sodium; level: Normal     # Elevated blood glucose: 104 with last check on 9/3/19. Will monitor.     # Chronic cough: Unclear etiology. I recommend he follow-up with a pulmonologist.     # Skin Cancer Risk:    - Discussed sun protection and recommend regular follow up with Dermatology.    # Medical Compliance: Yes    # Transplant History:  Etiology of kidney failure: IgA nephropathy  Tx: LDKT  Transplant: 3/20/2014 (Kidney)  Donor Type: Living      Donor Class: Standard Criteria  Donor  Crossmatch at time of Tx: negative  DSA at time of Tx: No  History of BK viremia: No  History of CMV viremia: No  History of EBV viremia: No  Significant changes in immunosuppression: None  Significant Complications: None    Transplant Office Phone Number: [phone]    Assessment and plan was discussed with the patient and he voiced his understanding and agreement.    Return visit: 12 months.     Chief Complaint   Mr. Souza is a 59 year old here for routine follow up.    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 Patient was last evaluated on 10/1/18 by Dr. Turner (fellow); please see that note for further details.     Today, the patient reports that he is doing well overall. He does report an unchanged, persistent cough. Has tried zyrtec, but this did not alleviate his symptoms. Chest X-rays have been negative.  No recent hospitalizations or new medical issues. Energy level and appetite are stable. Has some nausea in the morning. No chest pain or shortness of breath with exertion. Denies lower extremity swelling. No other concerns.    Recent Hospitalizations:  [x] No [] Yes    New Medical Issues: [x] No [] Yes    Decreased energy: [x] No [] Yes    Chest pain or SOB with exertion:  [x] No [] Yes    Appetite change or weight change: [x] No [] Yes    Nausea, vomiting or diarrhea:  [x] No [] Yes    Fever, sweats or chills: [x] No [] Yes    Leg swelling: [x] No [] Yes      Home BP: 120s/70s    Review of Systems   A comprehensive review of systems was obtained and negative, except as noted in the HPI or PMH.    Problem List   Patient Active Problem List   Diagnosis     Hypertension     Dyslipidemia     Nephrolithiasis     Lupus anticoagulant positive     Atrial myxoma     Kidney replaced by transplant     Immunosuppressed status (H)     Vitamin D deficiency     Social  "History   Social History     Tobacco Use     Smoking status: Never Smoker     Smokeless tobacco: Never Used   Substance Use Topics     Alcohol use: Yes     Alcohol/week: 2.5 standard drinks     Types: 3 drink(s) per week     Drug use: No     Allergies   No Known Allergies    Medications   Current Outpatient Medications   Medication Sig     metoprolol (LOPRESSOR) 50 MG tablet Take 3 tablets (150 mg) by mouth 2 times daily (Patient taking differently: Take 50 mg by mouth 2 times daily Patient taking two tab in the am and two tab in the pm (100 mg total))     mycophenolate (GENERIC EQUIVALENT) 250 MG capsule Take 3 capsules (750 mg) by mouth 2 times daily     Omega-3 Fatty Acids (FISH OIL) 1000 MG CPDR Take 2 capsules by mouth daily     omeprazole (PRILOSEC) 20 MG capsule Take 40 mg by mouth daily      sulfamethoxazole-trimethoprim (BACTRIM/SEPTRA) 400-80 MG tablet Take 1 tablet by mouth daily     tacrolimus (GENERIC EQUIVALENT) 0.5 MG capsule TAKE TWO CAPSULES BY MOUTH EVERY MORNING AND ONE CAPSULE IN THE EVENING     No current facility-administered medications for this visit.      There are no discontinued medications.    Physical Exam   Vital Signs: /78   Pulse 74   Ht 1.676 m (5' 6\")   Wt 81.4 kg (179 lb 8 oz)   SpO2 96%   BMI 28.97 kg/m      GENERAL APPEARANCE: alert and no distress  HENT: mouth without ulcers or lesions  LYMPHATICS: no cervical or supraclavicular nodes  RESP: lungs clear to auscultation - no rales, rhonchi or wheezes  CV: regular rhythm, normal rate, no rub, no murmur  EDEMA: no LE edema bilaterally  ABDOMEN: soft, nondistended, nontender, bowel sounds normal  MS: extremities normal - no gross deformities noted, no evidence of inflammation in joints, no muscle tenderness  SKIN: no rash  TX KIDNEY: normal  DIALYSIS ACCESS:  None      Data     Renal Latest Ref Rng & Units 9/3/2019 2/20/2019 11/20/2018   Na 133 - 144 mmol/L - - -   Na (external) 136 - 145 MMOL/L 137 137 141   K 3.4 - 5.3 " mmol/L - - -   K (external) 3.5 - 5.1 MMOL/L 4.0 4.0 4.0   Cl 94 - 109 mmol/L - - -   Cl (external) 98 - 107 MMOL/L 106 107 110(H)   CO2 20 - 32 mmol/L - - -   CO2 (external) 21 - 32 MMOL/L 23 23 20(L)   BUN 7 - 30 mg/dL - - -   BUN (external) 7 - 18 MG/DL 18 22(H) 17   Cr 0.66 - 1.25 mg/dL - - -   Cr (external) 0.70 - 1.30 MG/DL 1.35(H) 1.20 1.30   Glucose 60 - 99 mg/dL - - -   Glucose (external) 70 - 100 MG/(H) 117(H) 117(H)   Ca  8.5 - 10.4 mg/dL - - -   Ca (external) 8.5 - 10.1 MG/DL 8.9 8.7 8.3(L)   Mg 1.6 - 2.3 mg/dL - - -   Mg (external) 1.8 - 2.4 MG/DL - - -     Bone Health Latest Ref Rng & Units 4/14/2014 3/27/2014 3/24/2014   Phos 2.5 - 4.5 mg/dL 3.5 3.2 2.8   PTHi 12 - 72 pg/mL - 79(H) -   Vit D Def 30 - 75 ug/L - 28(L) -     Heme Latest Ref Rng & Units 9/3/2019 2/20/2019 11/20/2018   WBC 4.0 - 11.0 10e9/L - - -   WBC (external) 3.50 - 11.50 K/uL 6.74 6.27 5.99   Hgb 13.3 - 17.7 g/dL - - -   Hgb (external) 14.0 - 18.0 g/dL 15.5 15.5 15.2   Plt 150 - 450 10e9/L - - -   Plt (external) 150 - 400 K/uL 219 227 236     Liver Latest Ref Rng & Units 2/15/2018 3/31/2017 6/28/2016   AP 40 - 150 U/L - - -   AP (external) 50 - 136 U/L 65 80 67   TBili 0.2 - 1.3 mg/dL - - -   TBili (external) 0.2 - 1.0 MG/DL 0.4 0.6 0.5   ALT 0 - 70 U/L - - -   ALT (external) 16 - 61 U/L 31 33 43   AST 0 - 45 U/L - - -   AST (external) 15 - 37 U/L 25 17 24   Tot Protein 6.8 - 8.8 g/dL - - -   Tot Protein (external) 6.4 - 8.2 G/DL 7.3 7.3 7.1   Albumin 3.9 - 5.1 g/dL - - -   Albumin (external) 3.4 - 5.0 G/DL 4.0 4.0 3.9        Iron studies Latest Ref Rng & Units 3/27/2014   Iron 35 - 180 ug/dL 43   Iron sat 15 - 46 % 14(L)     [facility] Txp Virology Latest Ref Rng & Units 9/3/2019 2/20/2019 2/15/2018   BK Quant Log Ext log copies/mL <2.6 <2.6 <2.6   BK Quant Result Ext Copies/mL <390 <390 <390   BK Quant Spec Ext - - - BLOOD   Hep B Core NEG - - -   Hep B Core Ext Negative - - -   Hep B Surf - - - -   HIV 1&2 NEG - - -        Recent  Labs   Lab Test 04/14/14  0927 04/14/14  1025 04/14/14  1123   DOSTAC Not Provided 4/14/14    [id number] 4/14/14   0715   TACROL 6.0 5.6 5.1     Recent Labs   Lab Test 04/14/14  0927 04/14/14  1025 04/14/14  1123   DOSMPA Not Provided 4/14/14   0715 4/14/14   0715   MPACID 8.60* 3.28 3.76*   MPAG 157.2* 159.6* 129.1*       Scribe Disclosure:  I, Stuart Wright, am serving as a scribe to document services personally performed by Kidney/Pancreas Recipient at this visit, based upon the provider's statements to me. All documentation has been reviewed by the aforementioned provider prior to being entered into the official medical record.

## 2019-09-26 NOTE — LETTER
9/26/2019      RE: Christiano Souza  [address] [place] [place] [address]       CHRONIC TRANSPLANT NEPHROLOGY VISIT    Assessment & Plan   # LDKT: Stable   - Baseline Cr ~ 1.2-1.4; 1.35 as of 9/3   - Proteinuria: Normal (<0.2 grams)   - Date DSA Last Checked: Not Known       - BK Viremia: No   - Kidney Tx Biopsy: No    The patient has excellent allograft function with baseline creatinine 1.2-1.4 and most recent 1.35 mg/dL this month.  The patient has no proteinuria.  He has no donor specific antibody.  He did state that he received a kidney from his identical twin brother.  If this were the case I think it would be reasonable actually to decrease the patient's immunosuppression and get on just a low dose of CellCept.  I will discuss this with our HLA lab.    # Immunosuppression: Tacrolimus immediate release (goal 3-5) and Mycophenolate mofetil (goal 1-3.5)   - Changes: No    - Patient received a kidney from his genetically identical twin brother. Will consider lower immunosuppression.     # Prophylaxis:   - PJP: Sulfa/TMP (Bactrim)    # Hypertension: Controlled; Goal BP: < 130/80   - Changes: No    # Anemia in Chronic Renal Disease: Hgb: Stable        - Iron studies: Not checked recently    # Mineral Bone Disorder:   - Secondary renal hyperparathyroidism; PTH level: Not checked recently  - Vitamin D; level: Not checked recently  - Calcium; level: Normal  - Phosphorus; level: Not checked recently    # Electrolytes:   - Potassium; level: Normal  - Magnesium; level: Not checked recently  - Bicarbonate; level: Normal  - Sodium; level: Normal     # Elevated blood glucose: 104 with last check on 9/3/19. Will monitor.     # Chronic cough: Unclear etiology. I recommend he follow-up with a pulmonologist.     # Skin Cancer Risk:    - Discussed sun protection and recommend regular follow up with Dermatology.    # Medical Compliance: Yes    # Transplant History:  Etiology of kidney failure: IgA nephropathy  Tx: LDKT  Transplant:  3/20/2014 (Kidney)  Donor Type: Living      Donor Class: Standard Criteria Donor  Crossmatch at time of Tx: negative  DSA at time of Tx: No  History of BK viremia: No  History of CMV viremia: No  History of EBV viremia: No  Significant changes in immunosuppression: None  Significant Complications: None    Transplant Office Phone Number: [phone]    Assessment and plan was discussed with the patient and he voiced his understanding and agreement.    Return visit: 12 months.     Chief Complaint   Mr. Souza is a 59 year old here for routine follow up.    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  Patient was last evaluated on 10/1/18 by Dr. Turner (fellow); please see that note for further details.     Today, the patient reports that he is doing well overall. He does report an unchanged, persistent cough. Has tried zyrtec, but this did not alleviate his symptoms. Chest X-rays have been negative.  No recent hospitalizations or new medical issues. Energy level and appetite are stable. Has some nausea in the morning. No chest pain or shortness of breath with exertion. Denies lower extremity swelling. No other concerns.    Recent Hospitalizations:  [x] No [] Yes    New Medical Issues: [x] No [] Yes    Decreased energy: [x] No [] Yes    Chest pain or SOB with exertion:  [x] No [] Yes    Appetite change or weight change: [x] No [] Yes    Nausea, vomiting or diarrhea:  [x] No [] Yes    Fever, sweats or chills: [x] No [] Yes    Leg swelling: [x] No [] Yes      Home BP: 120s/70s    Review of Systems   A comprehensive review of systems was obtained and negative, except as noted in the HPI or PMH.    Problem List   Patient Active Problem List   Diagnosis     Hypertension     Dyslipidemia     Nephrolithiasis     Lupus anticoagulant positive     Atrial myxoma     Kidney replaced by  "transplant     Immunosuppressed status (H)     Vitamin D deficiency     Social History   Social History     Tobacco Use     Smoking status: Never Smoker     Smokeless tobacco: Never Used   Substance Use Topics     Alcohol use: Yes     Alcohol/week: 2.5 standard drinks     Types: 3 drink(s) per week     Drug use: No     Allergies   No Known Allergies    Medications   Current Outpatient Medications   Medication Sig     metoprolol (LOPRESSOR) 50 MG tablet Take 3 tablets (150 mg) by mouth 2 times daily (Patient taking differently: Take 50 mg by mouth 2 times daily Patient taking two tab in the am and two tab in the pm (100 mg total))     mycophenolate (GENERIC EQUIVALENT) 250 MG capsule Take 3 capsules (750 mg) by mouth 2 times daily     Omega-3 Fatty Acids (FISH OIL) 1000 MG CPDR Take 2 capsules by mouth daily     omeprazole (PRILOSEC) 20 MG capsule Take 40 mg by mouth daily      sulfamethoxazole-trimethoprim (BACTRIM/SEPTRA) 400-80 MG tablet Take 1 tablet by mouth daily     tacrolimus (GENERIC EQUIVALENT) 0.5 MG capsule TAKE TWO CAPSULES BY MOUTH EVERY MORNING AND ONE CAPSULE IN THE EVENING     No current facility-administered medications for this visit.      There are no discontinued medications.    Physical Exam   Vital Signs: /78   Pulse 74   Ht 1.676 m (5' 6\")   Wt 81.4 kg (179 lb 8 oz)   SpO2 96%   BMI 28.97 kg/m       GENERAL APPEARANCE: alert and no distress  HENT: mouth without ulcers or lesions  LYMPHATICS: no cervical or supraclavicular nodes  RESP: lungs clear to auscultation - no rales, rhonchi or wheezes  CV: regular rhythm, normal rate, no rub, no murmur  EDEMA: no LE edema bilaterally  ABDOMEN: soft, nondistended, nontender, bowel sounds normal  MS: extremities normal - no gross deformities noted, no evidence of inflammation in joints, no muscle tenderness  SKIN: no rash  TX KIDNEY: normal  DIALYSIS ACCESS:  None      Data     Renal Latest Ref Rng & Units 9/3/2019 2/20/2019 11/20/2018   Na " 133 - 144 mmol/L - - -   Na (external) 136 - 145 MMOL/L 137 137 141   K 3.4 - 5.3 mmol/L - - -   K (external) 3.5 - 5.1 MMOL/L 4.0 4.0 4.0   Cl 94 - 109 mmol/L - - -   Cl (external) 98 - 107 MMOL/L 106 107 110(H)   CO2 20 - 32 mmol/L - - -   CO2 (external) 21 - 32 MMOL/L 23 23 20(L)   BUN 7 - 30 mg/dL - - -   BUN (external) 7 - 18 MG/DL 18 22(H) 17   Cr 0.66 - 1.25 mg/dL - - -   Cr (external) 0.70 - 1.30 MG/DL 1.35(H) 1.20 1.30   Glucose 60 - 99 mg/dL - - -   Glucose (external) 70 - 100 MG/(H) 117(H) 117(H)   Ca  8.5 - 10.4 mg/dL - - -   Ca (external) 8.5 - 10.1 MG/DL 8.9 8.7 8.3(L)   Mg 1.6 - 2.3 mg/dL - - -   Mg (external) 1.8 - 2.4 MG/DL - - -     Bone Health Latest Ref Rng & Units 4/14/2014 3/27/2014 3/24/2014   Phos 2.5 - 4.5 mg/dL 3.5 3.2 2.8   PTHi 12 - 72 pg/mL - 79(H) -   Vit D Def 30 - 75 ug/L - 28(L) -     Heme Latest Ref Rng & Units 9/3/2019 2/20/2019 11/20/2018   WBC 4.0 - 11.0 10e9/L - - -   WBC (external) 3.50 - 11.50 K/uL 6.74 6.27 5.99   Hgb 13.3 - 17.7 g/dL - - -   Hgb (external) 14.0 - 18.0 g/dL 15.5 15.5 15.2   Plt 150 - 450 10e9/L - - -   Plt (external) 150 - 400 K/uL 219 227 236     Liver Latest Ref Rng & Units 2/15/2018 3/31/2017 6/28/2016   AP 40 - 150 U/L - - -   AP (external) 50 - 136 U/L 65 80 67   TBili 0.2 - 1.3 mg/dL - - -   TBili (external) 0.2 - 1.0 MG/DL 0.4 0.6 0.5   ALT 0 - 70 U/L - - -   ALT (external) 16 - 61 U/L 31 33 43   AST 0 - 45 U/L - - -   AST (external) 15 - 37 U/L 25 17 24   Tot Protein 6.8 - 8.8 g/dL - - -   Tot Protein (external) 6.4 - 8.2 G/DL 7.3 7.3 7.1   Albumin 3.9 - 5.1 g/dL - - -   Albumin (external) 3.4 - 5.0 G/DL 4.0 4.0 3.9        Iron studies Latest Ref Rng & Units 3/27/2014   Iron 35 - 180 ug/dL 43   Iron sat 15 - 46 % 14(L)     [facility] Txp Virology Latest Ref Rng & Units 9/3/2019 2/20/2019 2/15/2018   BK Quant Log Ext log copies/mL <2.6 <2.6 <2.6   BK Quant Result Ext Copies/mL <390 <390 <390   BK Quant Spec Ext - - - BLOOD   Hep B Core NEG - - -   Hep B  Core Ext Negative - - -   Hep B Surf - - - -   HIV 1&2 NEG - - -        Recent Labs   Lab Test 04/14/14  0927 04/14/14  1025 04/14/14  1123   DOSTAC Not Provided 4/14/14    18367 4/14/14 0715   TACROL 6.0 5.6 5.1     Recent Labs   Lab Test 04/14/14  0927 04/14/14  1025 04/14/14  1123   DOSMPA Not Provided 4/14/14   0715 4/14/14   0715   MPACID 8.60* 3.28 3.76*   MPAG 157.2* 159.6* 129.1*       Scribe Disclosure:  I, Stuart Wright, am serving as a scribe to document services personally performed by Kidney/Pancreas Recipient at this visit, based upon the provider's statements to me. All documentation has been reviewed by the aforementioned provider prior to being entered into the official medical record.    Kidney/Pancreas Recipient

## 2019-09-26 NOTE — NURSING NOTE
"Chief Complaint   Patient presents with     RECHECK     Annual f/u tx 2014     Blood pressure 125/78, pulse 74, height 1.676 m (5' 6\"), weight 81.4 kg (179 lb 8 oz), SpO2 96 %.    Michelle Mandel, [facility]    "

## 2019-11-08 DIAGNOSIS — Z94.0 S/P KIDNEY TRANSPLANT: ICD-10-CM

## 2019-11-08 DIAGNOSIS — Z94.0 KIDNEY REPLACED BY TRANSPLANT: ICD-10-CM

## 2019-11-08 RX ORDER — MYCOPHENOLATE MOFETIL 250 MG/1
CAPSULE ORAL
Qty: 180 CAPSULE | Refills: 11 | Status: SHIPPED | OUTPATIENT
Start: 2019-11-08 | End: 2020-11-29

## 2019-11-08 RX ORDER — SULFAMETHOXAZOLE AND TRIMETHOPRIM 400; 80 MG/1; MG/1
TABLET ORAL
Qty: 90 TABLET | Refills: 3 | Status: SHIPPED | OUTPATIENT
Start: 2019-11-08 | End: 2020-11-29

## 2019-12-06 DIAGNOSIS — Z94.0 KIDNEY REPLACED BY TRANSPLANT: Primary | ICD-10-CM

## 2019-12-06 DIAGNOSIS — Z94.0 S/P KIDNEY TRANSPLANT: ICD-10-CM

## 2019-12-06 RX ORDER — TACROLIMUS 0.5 MG/1
CAPSULE ORAL
Qty: 90 CAPSULE | Refills: 11 | Status: SHIPPED | OUTPATIENT
Start: 2019-12-06 | End: 2020-12-18

## 2020-03-02 ENCOUNTER — HEALTH MAINTENANCE LETTER (OUTPATIENT)
Age: 60
End: 2020-03-02

## 2020-07-23 ENCOUNTER — TRANSFERRED RECORDS (OUTPATIENT)
Dept: HEALTH INFORMATION MANAGEMENT | Facility: CLINIC | Age: 60
End: 2020-07-23

## 2020-11-28 DIAGNOSIS — Z94.0 S/P KIDNEY TRANSPLANT: Primary | ICD-10-CM

## 2020-11-28 DIAGNOSIS — Z94.0 KIDNEY REPLACED BY TRANSPLANT: ICD-10-CM

## 2020-11-30 RX ORDER — SULFAMETHOXAZOLE AND TRIMETHOPRIM 400; 80 MG/1; MG/1
1 TABLET ORAL DAILY
Qty: 90 TABLET | Refills: 0 | Status: SHIPPED | OUTPATIENT
Start: 2020-11-30 | End: 2021-03-19

## 2020-11-30 RX ORDER — MYCOPHENOLATE MOFETIL 250 MG/1
750 CAPSULE ORAL 2 TIMES DAILY
Qty: 180 CAPSULE | Refills: 0 | Status: SHIPPED | OUTPATIENT
Start: 2020-11-30 | End: 2021-01-05

## 2020-12-17 DIAGNOSIS — Z94.0 S/P KIDNEY TRANSPLANT: ICD-10-CM

## 2020-12-17 DIAGNOSIS — Z94.0 KIDNEY REPLACED BY TRANSPLANT: ICD-10-CM

## 2020-12-18 RX ORDER — TACROLIMUS 0.5 MG/1
CAPSULE ORAL
Qty: 90 CAPSULE | Refills: 0 | Status: SHIPPED | OUTPATIENT
Start: 2020-12-18 | End: 2021-02-04

## 2020-12-20 ENCOUNTER — HEALTH MAINTENANCE LETTER (OUTPATIENT)
Age: 60
End: 2020-12-20

## 2021-01-05 ENCOUNTER — MYC REFILL (OUTPATIENT)
Dept: TRANSPLANT | Facility: CLINIC | Age: 61
End: 2021-01-05

## 2021-01-05 DIAGNOSIS — Z94.0 S/P KIDNEY TRANSPLANT: ICD-10-CM

## 2021-01-05 DIAGNOSIS — Z94.0 KIDNEY REPLACED BY TRANSPLANT: Primary | ICD-10-CM

## 2021-01-05 RX ORDER — MYCOPHENOLATE MOFETIL 250 MG/1
750 CAPSULE ORAL 2 TIMES DAILY
Qty: 180 CAPSULE | Refills: 0 | Status: SHIPPED | OUTPATIENT
Start: 2021-01-05 | End: 2021-02-04

## 2021-02-04 DIAGNOSIS — Z94.0 S/P KIDNEY TRANSPLANT: ICD-10-CM

## 2021-02-04 DIAGNOSIS — Z94.0 KIDNEY REPLACED BY TRANSPLANT: Primary | ICD-10-CM

## 2021-02-04 RX ORDER — TACROLIMUS 0.5 MG/1
CAPSULE ORAL
Qty: 90 CAPSULE | Refills: 0 | Status: SHIPPED | OUTPATIENT
Start: 2021-02-04 | End: 2021-02-04

## 2021-02-04 RX ORDER — TACROLIMUS 0.5 MG/1
CAPSULE ORAL
Qty: 90 CAPSULE | Refills: 0 | Status: SHIPPED | OUTPATIENT
Start: 2021-02-04 | End: 2021-03-03

## 2021-02-04 RX ORDER — MYCOPHENOLATE MOFETIL 250 MG/1
750 CAPSULE ORAL 2 TIMES DAILY
Qty: 180 CAPSULE | Refills: 0 | Status: SHIPPED | OUTPATIENT
Start: 2021-02-04 | End: 2021-03-03

## 2021-02-04 RX ORDER — MYCOPHENOLATE MOFETIL 250 MG/1
750 CAPSULE ORAL 2 TIMES DAILY
Qty: 180 CAPSULE | Refills: 0 | Status: SHIPPED | OUTPATIENT
Start: 2021-02-04 | End: 2021-02-04

## 2021-02-04 NOTE — LETTER
OUTPATIENT LABORATORY TEST ORDER    Patient Name: Christiano Souza  Transplant Date: 3/20/2014 (Kidney)  YOB: 1960                                 Issue Date & Time: 2/4/2021  1:45 PM   [facility] MR:  [id number]  Exp. Date (1 year after date issued)      Diagnoses: Kidney Transplant (ICD-10  Z94.0)   Long term use of medications (ICD-10  Z79.899)     Lab results to be available on the same day drawn.   Patient should release information to the [facility]. Ensure fax number for results has been updated.    Monthly    ?Hemogram and Platelet   ?Basic Metabolic Panel (Sodium, Potassium, Chloride, CO2, Creatinine, BUN, Glucose, Calcium)   ?/Tacrolimus/Prograf drug level                     Every 6 Months                ?Random urine protein/creatinine ratio     If you have any questions, please call [facility] at [phone].  Please fax labs to [phone]

## 2021-02-04 NOTE — TELEPHONE ENCOUNTER
RNCC spoke with Christiano about virtual transplant nephrology appt as he lives in [place]. He agrees to schedulers reaching out to set up annual appt. Will send refill and he agrees to get labwork once the orders are received at [facility]. Will send copy of lab requisition letter to Christiano as well. Orders sent.    Prescriptions for mycophenolate and tacrolimus would not transmit via E-scribe. Verbal prescriptions called into [facility]

## 2021-03-03 ENCOUNTER — TELEPHONE (OUTPATIENT)
Dept: TRANSPLANT | Facility: CLINIC | Age: 61
End: 2021-03-03

## 2021-03-03 DIAGNOSIS — Z94.0 S/P KIDNEY TRANSPLANT: ICD-10-CM

## 2021-03-03 DIAGNOSIS — Z79.60 LONG-TERM USE OF IMMUNOSUPPRESSANT MEDICATION: Primary | ICD-10-CM

## 2021-03-03 DIAGNOSIS — Z94.0 KIDNEY REPLACED BY TRANSPLANT: ICD-10-CM

## 2021-03-03 RX ORDER — MYCOPHENOLATE MOFETIL 250 MG/1
750 CAPSULE ORAL 2 TIMES DAILY
Qty: 180 CAPSULE | Refills: 0 | Status: SHIPPED | OUTPATIENT
Start: 2021-03-03 | End: 2021-04-05

## 2021-03-03 RX ORDER — TACROLIMUS 0.5 MG/1
CAPSULE ORAL
Qty: 90 CAPSULE | Refills: 0 | Status: SHIPPED | OUTPATIENT
Start: 2021-03-03 | End: 2021-04-05

## 2021-03-19 ENCOUNTER — MYC REFILL (OUTPATIENT)
Dept: TRANSPLANT | Facility: CLINIC | Age: 61
End: 2021-03-19

## 2021-03-19 DIAGNOSIS — Z94.0 S/P KIDNEY TRANSPLANT: ICD-10-CM

## 2021-03-19 DIAGNOSIS — Z94.0 KIDNEY REPLACED BY TRANSPLANT: ICD-10-CM

## 2021-03-19 RX ORDER — SULFAMETHOXAZOLE AND TRIMETHOPRIM 400; 80 MG/1; MG/1
1 TABLET ORAL DAILY
Qty: 90 TABLET | Refills: 0 | Status: SHIPPED | OUTPATIENT
Start: 2021-03-19 | End: 2021-08-19

## 2021-04-03 DIAGNOSIS — Z79.60 LONG-TERM USE OF IMMUNOSUPPRESSANT MEDICATION: ICD-10-CM

## 2021-04-03 DIAGNOSIS — Z94.0 S/P KIDNEY TRANSPLANT: Primary | ICD-10-CM

## 2021-04-03 DIAGNOSIS — Z94.0 KIDNEY REPLACED BY TRANSPLANT: ICD-10-CM

## 2021-04-05 RX ORDER — MYCOPHENOLATE MOFETIL 250 MG/1
750 CAPSULE ORAL 2 TIMES DAILY
Qty: 180 CAPSULE | Refills: 0 | Status: SHIPPED | OUTPATIENT
Start: 2021-04-05 | End: 2021-05-07

## 2021-04-05 RX ORDER — TACROLIMUS 0.5 MG/1
CAPSULE ORAL
Qty: 90 CAPSULE | Refills: 0 | Status: SHIPPED | OUTPATIENT
Start: 2021-04-05 | End: 2021-05-07

## 2021-04-18 ENCOUNTER — HEALTH MAINTENANCE LETTER (OUTPATIENT)
Age: 61
End: 2021-04-18

## 2021-05-07 DIAGNOSIS — Z94.0 KIDNEY REPLACED BY TRANSPLANT: ICD-10-CM

## 2021-05-07 DIAGNOSIS — Z94.0 S/P KIDNEY TRANSPLANT: ICD-10-CM

## 2021-05-07 DIAGNOSIS — Z79.60 LONG-TERM USE OF IMMUNOSUPPRESSANT MEDICATION: ICD-10-CM

## 2021-05-07 RX ORDER — MYCOPHENOLATE MOFETIL 250 MG/1
CAPSULE ORAL
Qty: 180 CAPSULE | Refills: 0 | Status: SHIPPED | OUTPATIENT
Start: 2021-05-07 | End: 2021-05-10

## 2021-05-07 RX ORDER — TACROLIMUS 0.5 MG/1
CAPSULE ORAL
Qty: 90 CAPSULE | Refills: 0 | Status: SHIPPED | OUTPATIENT
Start: 2021-05-07 | End: 2021-05-10

## 2021-05-10 DIAGNOSIS — Z79.60 LONG-TERM USE OF IMMUNOSUPPRESSANT MEDICATION: ICD-10-CM

## 2021-05-10 DIAGNOSIS — Z94.0 S/P KIDNEY TRANSPLANT: ICD-10-CM

## 2021-05-10 DIAGNOSIS — Z94.0 KIDNEY REPLACED BY TRANSPLANT: Primary | ICD-10-CM

## 2021-05-10 RX ORDER — TACROLIMUS 0.5 MG/1
0.5 CAPSULE ORAL 2 TIMES DAILY
Qty: 60 CAPSULE | Refills: 11 | Status: SHIPPED | OUTPATIENT
Start: 2021-05-10 | End: 2022-05-24

## 2021-05-10 RX ORDER — MYCOPHENOLATE MOFETIL 250 MG/1
750 CAPSULE ORAL 2 TIMES DAILY
Qty: 180 CAPSULE | Refills: 11 | Status: SHIPPED | OUTPATIENT
Start: 2021-05-10 | End: 2022-05-24

## 2021-05-10 NOTE — TELEPHONE ENCOUNTER
ISSUE:   Tacrolimus IR level 8.3 on 5/7/21, goal 3-5, dose 1 mg (2 caps) every morning and 0.5 mg (1 capsule) every evening.    PLAN:   Please call patient and confirm this was an accurate 12-hour trough. Verify Tacrolimus dose 1 mg (2 caps) every morning and 0.5 mg (1 capsule) every evening. Confirm no new medications or illness. Confirm no missed doses. If accurate trough and accurate dose, decrease Cyclosporine dose to 0.5 mg BID and repeat level in 2 weeks.    Lori Barnes, RN, BSN  Solid Organ Transplant, Post Kidney and Pancreas  Transplant Care Coordinator  [phone]

## 2021-05-10 NOTE — LETTER
PHYSICIAN ORDERS      DATE & TIME ISSUED: May 10, 2021 1:40 PM  PATIENT NAME: Christiano Souza   : 1960     [facility] MR# [if applicable]: [id number]     DIAGNOSIS:  Kidney Transplant  ICD-10 CODE: Z94.0     Please repeat the following labs in 2 weeks:  Tacrolimus drug level  CBC  BMP    Any questions please call: [phone]  Please fax lab results to [phone].    .

## 2021-05-10 NOTE — TELEPHONE ENCOUNTER
Spoke to patient regarding:  Tacrolimus IR level 8.3 on 5/7/21, goal 3-5, dose 1 mg (2 caps) every morning and 0.5 mg (1 capsule) every evening.  confirmed this was an accurate 12-hour trough. Verified Tacrolimus dose 0.5 mg BID. Confirmed no new medications or illness. Confirmed no missed doses. Patient agrees to repeat level in 2 weeks

## 2021-08-19 ENCOUNTER — MYC REFILL (OUTPATIENT)
Dept: TRANSPLANT | Facility: CLINIC | Age: 61
End: 2021-08-19

## 2021-08-19 DIAGNOSIS — Z94.0 S/P KIDNEY TRANSPLANT: ICD-10-CM

## 2021-08-19 DIAGNOSIS — Z94.0 KIDNEY REPLACED BY TRANSPLANT: ICD-10-CM

## 2021-08-19 RX ORDER — SULFAMETHOXAZOLE AND TRIMETHOPRIM 400; 80 MG/1; MG/1
1 TABLET ORAL DAILY
Qty: 90 TABLET | Refills: 0 | Status: SHIPPED | OUTPATIENT
Start: 2021-08-19 | End: 2021-10-15

## 2021-09-24 ENCOUNTER — TRANSFERRED RECORDS (OUTPATIENT)
Dept: HEALTH INFORMATION MANAGEMENT | Facility: CLINIC | Age: 61
End: 2021-09-24

## 2021-10-03 ENCOUNTER — HEALTH MAINTENANCE LETTER (OUTPATIENT)
Age: 61
End: 2021-10-03

## 2021-10-14 DIAGNOSIS — Z94.0 S/P KIDNEY TRANSPLANT: ICD-10-CM

## 2021-10-14 DIAGNOSIS — Z94.0 KIDNEY REPLACED BY TRANSPLANT: ICD-10-CM

## 2021-10-15 RX ORDER — SULFAMETHOXAZOLE AND TRIMETHOPRIM 400; 80 MG/1; MG/1
1 TABLET ORAL DAILY
Qty: 90 TABLET | Refills: 0 | Status: SHIPPED | OUTPATIENT
Start: 2021-10-15 | End: 2022-02-24

## 2022-02-23 DIAGNOSIS — Z94.0 S/P KIDNEY TRANSPLANT: ICD-10-CM

## 2022-02-23 DIAGNOSIS — Z94.0 KIDNEY REPLACED BY TRANSPLANT: ICD-10-CM

## 2022-02-24 RX ORDER — SULFAMETHOXAZOLE AND TRIMETHOPRIM 400; 80 MG/1; MG/1
1 TABLET ORAL DAILY
Qty: 90 TABLET | Refills: 0 | Status: SHIPPED | OUTPATIENT
Start: 2022-02-24 | End: 2022-05-24

## 2022-05-14 ENCOUNTER — HEALTH MAINTENANCE LETTER (OUTPATIENT)
Age: 62
End: 2022-05-14

## 2022-05-24 ENCOUNTER — MYC MEDICAL ADVICE (OUTPATIENT)
Dept: TRANSPLANT | Facility: CLINIC | Age: 62
End: 2022-05-24

## 2022-05-24 ENCOUNTER — MYC REFILL (OUTPATIENT)
Dept: TRANSPLANT | Facility: CLINIC | Age: 62
End: 2022-05-24

## 2022-05-24 DIAGNOSIS — Z79.60 LONG-TERM USE OF IMMUNOSUPPRESSANT MEDICATION: ICD-10-CM

## 2022-05-24 DIAGNOSIS — Z94.0 KIDNEY REPLACED BY TRANSPLANT: Primary | ICD-10-CM

## 2022-05-24 DIAGNOSIS — Z94.0 S/P KIDNEY TRANSPLANT: ICD-10-CM

## 2022-05-24 DIAGNOSIS — Z94.0 S/P KIDNEY TRANSPLANT: Primary | ICD-10-CM

## 2022-05-24 RX ORDER — TACROLIMUS 0.5 MG/1
0.5 CAPSULE ORAL 2 TIMES DAILY
Qty: 60 CAPSULE | Refills: 0 | Status: SHIPPED | OUTPATIENT
Start: 2022-05-24 | End: 2022-06-24

## 2022-05-24 RX ORDER — MYCOPHENOLATE MOFETIL 250 MG/1
750 CAPSULE ORAL 2 TIMES DAILY
Qty: 180 CAPSULE | Refills: 0 | Status: SHIPPED | OUTPATIENT
Start: 2022-05-24 | End: 2022-06-25

## 2022-05-24 RX ORDER — SULFAMETHOXAZOLE AND TRIMETHOPRIM 400; 80 MG/1; MG/1
1 TABLET ORAL DAILY
Qty: 90 TABLET | Refills: 0 | Status: SHIPPED | OUTPATIENT
Start: 2022-05-24 | End: 2022-07-27

## 2022-05-24 RX ORDER — TACROLIMUS 0.5 MG/1
0.5 CAPSULE ORAL 2 TIMES DAILY
Qty: 60 CAPSULE | Refills: 0 | Status: SHIPPED | OUTPATIENT
Start: 2022-05-24 | End: 2022-05-24

## 2022-05-24 RX ORDER — MYCOPHENOLATE MOFETIL 250 MG/1
750 CAPSULE ORAL 2 TIMES DAILY
Qty: 180 CAPSULE | Refills: 0 | Status: SHIPPED | OUTPATIENT
Start: 2022-05-24 | End: 2022-05-24

## 2022-06-22 ENCOUNTER — MYC MEDICAL ADVICE (OUTPATIENT)
Dept: OTHER | Age: 62
End: 2022-06-22

## 2022-06-24 ENCOUNTER — MYC REFILL (OUTPATIENT)
Dept: TRANSPLANT | Facility: CLINIC | Age: 62
End: 2022-06-24

## 2022-06-24 DIAGNOSIS — Z94.0 KIDNEY REPLACED BY TRANSPLANT: ICD-10-CM

## 2022-06-24 DIAGNOSIS — Z94.0 S/P KIDNEY TRANSPLANT: ICD-10-CM

## 2022-06-24 DIAGNOSIS — Z79.60 LONG-TERM USE OF IMMUNOSUPPRESSANT MEDICATION: ICD-10-CM

## 2022-06-25 ENCOUNTER — MYC REFILL (OUTPATIENT)
Dept: TRANSPLANT | Facility: CLINIC | Age: 62
End: 2022-06-25

## 2022-06-25 DIAGNOSIS — Z94.0 KIDNEY REPLACED BY TRANSPLANT: ICD-10-CM

## 2022-06-25 DIAGNOSIS — Z79.60 LONG-TERM USE OF IMMUNOSUPPRESSANT MEDICATION: ICD-10-CM

## 2022-06-25 DIAGNOSIS — Z94.0 S/P KIDNEY TRANSPLANT: Primary | ICD-10-CM

## 2022-06-27 DIAGNOSIS — Z94.0 S/P KIDNEY TRANSPLANT: ICD-10-CM

## 2022-06-27 DIAGNOSIS — Z79.60 LONG-TERM USE OF IMMUNOSUPPRESSANT MEDICATION: ICD-10-CM

## 2022-06-27 DIAGNOSIS — Z94.0 KIDNEY REPLACED BY TRANSPLANT: Primary | ICD-10-CM

## 2022-06-27 RX ORDER — MYCOPHENOLATE MOFETIL 250 MG/1
750 CAPSULE ORAL 2 TIMES DAILY
Qty: 180 CAPSULE | Refills: 0 | Status: SHIPPED | OUTPATIENT
Start: 2022-06-27 | End: 2022-07-27

## 2022-06-27 RX ORDER — TACROLIMUS 0.5 MG/1
0.5 CAPSULE ORAL 2 TIMES DAILY
Qty: 60 CAPSULE | Refills: 0 | Status: SHIPPED | OUTPATIENT
Start: 2022-06-27 | End: 2022-06-27

## 2022-06-27 RX ORDER — TACROLIMUS 0.5 MG/1
1 CAPSULE ORAL 2 TIMES DAILY
Qty: 120 CAPSULE | Refills: 11 | Status: SHIPPED | OUTPATIENT
Start: 2022-06-27 | End: 2022-10-07

## 2022-06-27 NOTE — TELEPHONE ENCOUNTER
Spoke to patient and confirmed this was an accurate 12-hour trough. Verified Tacrolimus IR dose 0.5 mg BID. Confirmed no new medications or illness. Confirmed no missed doses. Patient confirms increase Tacrolimus IR dose to 1 mg BID and repeat labs in 1-2 weeks

## 2022-06-27 NOTE — LETTER
PHYSICIAN ORDERS      DATE & TIME ISSUED: 2022 2:30 PM  PATIENT NAME: Christiano Souza   : 1960     [facility] MR# [if applicable]: [id number]     DIAGNOSIS:  Kidney Transplant  ICD-10 CODE: Z94.0     Please repeat the following labs in 1-2 weeks:  Tacrolimus drug level  CBC  BMP    Any questions please call: [phone]  Please fax lab results to [phone].    .

## 2022-06-27 NOTE — TELEPHONE ENCOUNTER
ISSUE:   Tacrolimus IR level 2.8 on 6/23/2022, goal 3-5, dose 0.5 mg BID.    LPN TASK/ PLAN:   Please call patient and confirm this was an accurate 12-hour trough. Verify Tacrolimus IR dose 0.5 mg BID. Confirm no new medications or illness. Confirm no missed doses. If accurate trough and accurate dose, increase Tacrolimus IR dose to 1 mg BID and repeat labs in 1-2 weeks    Please place repeat BMP and tac orders and update RX. Thank you.    Celena Page RN   Transplant Coordinator  [phone]

## 2022-07-27 ENCOUNTER — MYC REFILL (OUTPATIENT)
Dept: TRANSPLANT | Facility: CLINIC | Age: 62
End: 2022-07-27

## 2022-07-27 DIAGNOSIS — Z94.0 S/P KIDNEY TRANSPLANT: ICD-10-CM

## 2022-07-27 DIAGNOSIS — Z94.0 KIDNEY REPLACED BY TRANSPLANT: Primary | ICD-10-CM

## 2022-07-27 DIAGNOSIS — Z79.60 LONG-TERM USE OF IMMUNOSUPPRESSANT MEDICATION: ICD-10-CM

## 2022-07-28 RX ORDER — SULFAMETHOXAZOLE AND TRIMETHOPRIM 400; 80 MG/1; MG/1
1 TABLET ORAL DAILY
Qty: 90 TABLET | Refills: 3 | Status: SHIPPED | OUTPATIENT
Start: 2022-07-28 | End: 2022-10-24

## 2022-07-28 RX ORDER — MYCOPHENOLATE MOFETIL 250 MG/1
750 CAPSULE ORAL 2 TIMES DAILY
Qty: 180 CAPSULE | Refills: 11 | Status: SHIPPED | OUTPATIENT
Start: 2022-07-28 | End: 2022-10-24

## 2022-09-04 ENCOUNTER — HEALTH MAINTENANCE LETTER (OUTPATIENT)
Age: 62
End: 2022-09-04

## 2022-10-05 ENCOUNTER — MYC MEDICAL ADVICE (OUTPATIENT)
Dept: TRANSPLANT | Facility: CLINIC | Age: 62
End: 2022-10-05

## 2022-10-05 ENCOUNTER — TELEPHONE (OUTPATIENT)
Dept: TRANSPLANT | Facility: CLINIC | Age: 62
End: 2022-10-05

## 2022-10-05 DIAGNOSIS — Z94.0 S/P KIDNEY TRANSPLANT: ICD-10-CM

## 2022-10-05 DIAGNOSIS — Z79.60 LONG-TERM USE OF IMMUNOSUPPRESSANT MEDICATION: ICD-10-CM

## 2022-10-05 DIAGNOSIS — Z94.0 KIDNEY REPLACED BY TRANSPLANT: ICD-10-CM

## 2022-10-05 NOTE — TELEPHONE ENCOUNTER
ISSUE:   Tacrolimus IR level 12.6 on 10/3/2022, goal 3-5, dose 1 mg BID.    PLAN:   Please call patient and confirm this was an accurate 12-hour trough. Verify Tacrolimus IR dose 1 mg BID. Confirm no new medications or illness. Confirm no missed doses. If accurate trough and accurate dose, decrease Tacrolimus IR dose to 0.5 mg BID and repeat labs in 1 weeks    OUTCOME:   Spoke with patient via my chart, they confirm accurate trough level and current dose 1 mg BID. Patient confirmed dose change to 1 mg / 0.5 mgBID and to repeat labs in 1 weeks. Orders sent to preferred pharmacy for dose change and lab for repeat labs. Patient voiced understanding of plan.     Celena Page RN   Transplant Coordinator  [phone]

## 2022-10-07 RX ORDER — TACROLIMUS 0.5 MG/1
1 CAPSULE ORAL 2 TIMES DAILY
Qty: 120 CAPSULE | Refills: 11 | Status: SHIPPED | OUTPATIENT
Start: 2022-10-07 | End: 2022-10-24

## 2022-10-24 ENCOUNTER — TELEPHONE (OUTPATIENT)
Dept: NEPHROLOGY | Facility: CLINIC | Age: 62
End: 2022-10-24

## 2022-10-24 ENCOUNTER — VIRTUAL VISIT (OUTPATIENT)
Dept: NEPHROLOGY | Facility: CLINIC | Age: 62
End: 2022-10-24
Attending: INTERNAL MEDICINE

## 2022-10-24 VITALS — WEIGHT: 165 LBS | BODY MASS INDEX: 26.63 KG/M2

## 2022-10-24 DIAGNOSIS — N18.31 CHRONIC KIDNEY DISEASE, STAGE 3A (H): Primary | ICD-10-CM

## 2022-10-24 DIAGNOSIS — Z48.298 AFTERCARE FOLLOWING ORGAN TRANSPLANT: ICD-10-CM

## 2022-10-24 DIAGNOSIS — Z79.60 LONG-TERM USE OF IMMUNOSUPPRESSANT MEDICATION: ICD-10-CM

## 2022-10-24 DIAGNOSIS — D84.9 IMMUNOSUPPRESSION (H): ICD-10-CM

## 2022-10-24 DIAGNOSIS — Z29.89 NEED FOR PNEUMOCYSTIS PROPHYLAXIS: ICD-10-CM

## 2022-10-24 DIAGNOSIS — Z94.0 KIDNEY REPLACED BY TRANSPLANT: ICD-10-CM

## 2022-10-24 DIAGNOSIS — Z94.0 HTN, KIDNEY TRANSPLANT RELATED: ICD-10-CM

## 2022-10-24 DIAGNOSIS — Z94.0 S/P KIDNEY TRANSPLANT: ICD-10-CM

## 2022-10-24 DIAGNOSIS — I15.1 HTN, KIDNEY TRANSPLANT RELATED: ICD-10-CM

## 2022-10-24 PROBLEM — K21.9 GERD (GASTROESOPHAGEAL REFLUX DISEASE): Status: ACTIVE | Noted: 2022-10-24

## 2022-10-24 PROCEDURE — G0463 HOSPITAL OUTPT CLINIC VISIT: HCPCS | Mod: PN,RTG | Performed by: INTERNAL MEDICINE

## 2022-10-24 PROCEDURE — 99205 OFFICE O/P NEW HI 60 MIN: CPT | Mod: 95 | Performed by: INTERNAL MEDICINE

## 2022-10-24 RX ORDER — SULFAMETHOXAZOLE AND TRIMETHOPRIM 400; 80 MG/1; MG/1
1 TABLET ORAL DAILY
Qty: 90 TABLET | Refills: 3 | Status: SHIPPED | OUTPATIENT
Start: 2022-10-24 | End: 2023-11-15

## 2022-10-24 RX ORDER — MYCOPHENOLATE MOFETIL 250 MG/1
500 CAPSULE ORAL 2 TIMES DAILY
Qty: 360 CAPSULE | Refills: 3 | Status: SHIPPED | OUTPATIENT
Start: 2022-10-24 | End: 2024-01-15

## 2022-10-24 RX ORDER — METOPROLOL TARTRATE 100 MG
50 TABLET ORAL 2 TIMES DAILY
COMMUNITY
Start: 2022-10-24

## 2022-10-24 RX ORDER — TACROLIMUS 0.5 MG/1
1 CAPSULE ORAL 2 TIMES DAILY
Qty: 270 CAPSULE | Refills: 3 | Status: SHIPPED | OUTPATIENT
Start: 2022-10-24 | End: 2023-11-15

## 2022-10-24 RX ORDER — LOSARTAN POTASSIUM 25 MG/1
25 TABLET ORAL AT BEDTIME
Qty: 90 TABLET | Refills: 3 | Status: SHIPPED | OUTPATIENT
Start: 2022-10-24 | End: 2024-01-15

## 2022-10-24 ASSESSMENT — PAIN SCALES - GENERAL: PAINLEVEL: NO PAIN (0)

## 2022-10-24 NOTE — PATIENT INSTRUCTIONS
Patient Recommendations:  - Decrease mycophenolate mofetil to 500 mg every 12 hours.  - Continue on tacrolimus 1 mg in am and 0.5 mg in pm.  - Decrease metoprolol to 50 mg twice daily.  - Start losartan 25 mg nightly.  - Recommend patient check blood pressure at home on a regular basis, such as once every week or two, and follow up with PCP if above goal of less than 130/80.    Transplant Patient Information  Your Post Transplant Coordinator is: Celena Page  For non urgent items, we encourage you to contact your coordinator/care team online via [employer]  You and your care team can also contact your transplant coordinator Monday - Friday, 8am - 5pm at [phone] (Option 2 to reach the coordinator or Option 4 to schedule an appointment).  After hours for urgent matters, please call [facility] at [phone].

## 2022-10-24 NOTE — LETTER
10/24/2022       RE: Christiano Souza  [address] [place] [address]     Dear Colleague,    Thank you for referring your patient, Christiano Souza, to the [facility] [place] [facility]. Please see a copy of my visit note below.    Christiano is a 62 year old who is being evaluated via a billable video visit.      How would you like to obtain your AVS? MyChart  If the video visit is dropped, the invitation should be resent by: Send to e-mail at: [email or web address]  Will anyone else be joining your video visit? No   No BP taken        Video-Visit Details    Video Start Time: 0803    Type of service:  Video Visit    Video End Time:0821    Originating Location (pt. 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   - Changes: Yes - With HLA identical kidney and almost 9 years out from transplant with lower mycophenolate mofetil to 500 mg every 12 hours.    # Infection Prophylaxis:   - PJP: Sulfa/TMP (Bactrim)    # Hypertension: Not checked recently;  Goal BP: < 130/80   - Changes: Yes - With elevated blood glucose and some shortness of breath with exercise along with a bit lower exercise tolerance, will look to start ARB and possibly taper off beta blocker.  Will decrease metoprolol to 50 mg bid and  start losartan 25 mg nightly.    # Elevated Blood Glucose: Glucose generally running ~ 110-120s Last HbA1c: 6.0% on 9/2021   - Management as per primary care.   - Will start ARB.    # Mineral Bone Disorder:   - Vitamin D; level: Not checked recently        On supplement: No  - Calcium; level: Normal        On supplement: No    # Chronic Cough: Stable symptoms.    # GERD: Controlled on PPI.    # H/o Left Atrial Myxoma, s/p Resection: Patient had incidental finding of left atrial myxoma as part of his transplant evaluation 2014.  He underwent minimally invasive surgical resection.  No issues since.    # Skin Cancer Risk:    - Discussed sun protection and recommend regular follow up with Dermatology.    # Medical Compliance: No.  Evidence of labs less than recommended and infrequent transplant follow-up.  - Discussed importance of checking labs regularly as recommended, taking medications as prescribed and attending scheduled medical appointments.    # COVID-19 Virus Review: Discussed COVID-19 virus and the potential medical risks.  Reviewed preventative health recommendations, including wearing a mask where appropriate.  Recommended COVID vaccination should be up to date with either an initial vaccination or booster shot when appropriate.  Asked the patient to inform the transplant center if they are exposed or diagnosed with this virus.    # COVID Vaccination Up To Date: Not vaccinated     # Transplant History:  Etiology of Kidney Failure: IgA nephropathy  Tx: LDKT  Transplant: 3/20/2014 (Kidney)  Significant changes in immunosuppression: Decreased immunosuppression due to HLA identical kidney from his brother  Significant transplant-related complications: None      Transplant Office Phone Number: [phone]    Assessment and plan was discussed with the patient and he voiced his understanding and agreement.    Return visit: Return in about 1 year (around 10/24/2023).    Bradly Bliss MD      Reason for  Consult   Christiano Souza was seen in consultation at the request of Dr. Lopez for kidney transplant and immunosuppression management.    Chief Complaint   Mr. Souza is a 62 year old here for kidney transplant and immunosuppression management.    History of Present Illness    Mr. Souza reports feeling good overall with some medical complaints.  Since last clinic visit in 2019, patient reports no hospitalizations or new medical complaints and has been doing well overall.  His energy level is good and remains normal.  He is active and does get some exercise.  Denies any chest pain, but some shortness of breath with exertion, which is stable.  No palpitations.  No leg swelling.    Appetite is okay and weight has been stable.  No nausea or vomiting.  Occasional loose stools.  Heartburn symptoms are controlled on omeprazole.  No fever, sweats or chills.  No night sweats.    Recent Hospitalizations:  [x] No [] Yes    New Medical Issues: [x] No [] Yes    Decreased energy: [x] No [] Yes    Chest pain or SOB with exertion:  [] No [x] Yes Some POWER   Appetite change or weight change: [x] No [] Yes    Nausea, vomiting or diarrhea:  [] No [x] Yes Occasional loose stools.   Fever, sweats or chills: [x] No [] Yes    Leg swelling: [x] No [] Yes      Home BP: Not checked    Review of Systems   A comprehensive review of systems was obtained and negative, except as noted in the HPI or PMH.    Problem List   Patient Active Problem List   Diagnosis     HTN, kidney transplant related     Dyslipidemia     Nephrolithiasis     Lupus anticoagulant positive     Atrial myxoma     Kidney replaced by transplant     Immunosuppression (H)     Vitamin D deficiency     Chronic kidney disease, stage 3a (H)     Aftercare following organ transplant     Need for pneumocystis prophylaxis     GERD (gastroesophageal reflux disease)       Past Medical History    Past Medical History:   Diagnosis Date     Atrial myxoma 11/2013     ESRD on peritoneal  dialysis (H)      History of nephrolithiasis     History of nephrolithiasis with lithotripsy      HTN (hypertension) -     Hyperlipidemia      Hypertriglyceridemia      IgA nephropathy      Lupus anticoagulant positive 2103       Past Surgical History   Past Surgical History:   Procedure Laterality Date     ANGIOGRAM  3/20/2014    Procedure: ANGIOGRAM;  Transplant Nephroureteral Stent Placement with Fluoroscopy and Ultrasound;  Surgeon: Curly Sherman MD;  Location: [facility] OR     Left atrial myxoma resection - laprascopic  12/10/13     PERITONEAL DIALYSIS  2013     TRANSPLANT KIDNEY RECIPIENT LIVING RELATED  3/20/2014    Procedure: TRANSPLANT KIDNEY RECIPIENT LIVING RELATED;  Living Related Left Kidney Transplant Recipient Percutaneous Nephrostomy Tube Placement;  Surgeon: Richy Snyder MD;  Location: [facility] OR       Family History   Family History   Problem Relation Age of Onset     Osteoporosis Sister      Cancer Maternal Grandmother          later 70's, bone cancer     Cancer Paternal Grandmother          late 70's, stomach cancer     Cancer Paternal Grandfather          age 68 cancer?     Cardiovascular Maternal Grandfather          late 70's, CHF, dementia        Social History   Social History     Tobacco Use     Smoking status: Never     Smokeless tobacco: Never   Substance Use Topics     Alcohol use: Yes     Alcohol/week: 2.5 standard drinks     Types: 3 drink(s) per week     Drug use: No       Allergies   No Known Allergies    Medications   Current Outpatient Medications   Medication Sig     losartan (COZAAR) 25 MG tablet Take 1 tablet (25 mg) by mouth At Bedtime     metoprolol tartrate (LOPRESSOR) 100 MG tablet Take 0.5 tablets (50 mg) by mouth 2 times daily     mycophenolate (GENERIC EQUIVALENT) 250 MG capsule Take 2 capsules (500 mg) by mouth 2 times daily     Omega-3 Fatty Acids (FISH OIL) 1000 MG CPDR Take 2 capsules by mouth daily     omeprazole (PRILOSEC) 20  MG capsule Take 40 mg by mouth daily      sulfamethoxazole-trimethoprim (BACTRIM) 400-80 MG tablet Take 1 tablet by mouth daily     tacrolimus (GENERIC EQUIVALENT) 0.5 MG capsule Take 2 capsules (1 mg) by mouth 2 times daily Total dose: 1 mg in the AM, 0.5 mg in the PM     No current facility-administered medications for this visit.     Medications Discontinued During This Encounter   Medication Reason     sulfamethoxazole-trimethoprim (BACTRIM) 400-80 MG tablet      metoprolol (LOPRESSOR) 50 MG tablet      sulfamethoxazole-trimethoprim (BACTRIM) 400-80 MG tablet      mycophenolate (GENERIC EQUIVALENT) 250 MG capsule      tacrolimus (GENERIC EQUIVALENT) 0.5 MG capsule        Physical Exam   Vital Signs: Wt 74.8 kg (165 lb)   BMI 26.63 kg/m      GENERAL APPEARANCE: alert and no distress  HENT: no obvious abnormalities on appearance  RESP: breathing appears unremarkable with normal rate, no audible wheezing or cough and no apparent shortness of breath with conversation  MS: extremities normal - no gross deformities noted  SKIN: no apparent rash and normal skin tone  NEURO: speech is clear with no obvious neurological deficits  PSYCH: mentation appears normal and affect normal    Data     Renal Latest Ref Rng & Units 10/3/2022 6/23/2022 9/24/2021   Na 133 - 144 mmol/L - - -   Na (external) 136 - 145 mmol/L 137 139 137   K 3.4 - 5.3 mmol/L - - -   K (external) 3.5 - 5.1 mmol/L 3.8 3.6 3.9   Cl 102 - 111 mmol/L 107 108 108   CO2 20 - 32 mmol/L - - -   CO2 (external) 21 - 32 mmol/L 22 22 23   BUN 7 - 30 mg/dL - - -   BUN (external) 7 - 18 mg/dL 22(H) 11 11   Cr 0.66 - 1.25 mg/dL - - -   Cr (external) 0.70 - 1.30 mg/dL 1.34(H) 1.44(H) 1.34   Glucose 60 - 99 mg/dL - - -   Glucose (external) 70 - 100 mg/dL 122(H) 113(H) 98   Ca  8.5 - 10.4 mg/dL - - -   Ca (external) 8.5 - 10.1 mg/dL 8.9 9.1 9.0   Mg 1.6 - 2.3 mg/dL - - -   Mg (external) 1.8 - 2.4 MG/DL - - -     Bone Health Latest Ref Rng & Units 4/14/2014 3/27/2014  3/24/2014   Phos 2.5 - 4.5 mg/dL 3.5 3.2 2.8   PTHi 12 - 72 pg/mL - 79(H) -   Vit D Def 30 - 75 ug/L - 28(L) -     Heme Latest Ref Rng & Units 10/3/2022 6/23/2022 9/24/2021   WBC 4.0 - 11.0 10e9/L - - -   WBC (external) 3.50 - 11.50 K/uL - - 5.06   Hgb 13.3 - 17.7 g/dL - - -   Hgb (external) 14.0 - 18.0 g/dL 14.8 15.4 15.9   Plt 150 - 450 10e9/L - - -   Plt (external) 150 - 400 K/uL 209 232 203   ABSOLUTE NEUTROPHIL 1.6 - 8.3 10e9/L - - -   ABSOLUTE NEUTROPHILS (EXTERNAL) 2.0 - 8.0 K/uL - - 3.49   ABSOLUTE LYMPHOCYTES 0.8 - 5.3 10e9/L - - -   ABSOLUTE LYMPHOCYTES (EXTERNAL) 0.60 - 5.20 K/uL - - 0.91   ABSOLUTE MONOCYTES 0.0 - 1.3 10e9/L - - -   ABSOLUTE MONOCYTES (EXTERNAL) 0.00 - 1.00 K/uL - - 0.52   ABSOLUTE EOSINOPHILS 0.0 - 0.7 10e9/L - - -   ABSOLUTE EOSINOPHILS (EXTERNAL) 0.00 - 60 K/uL - - 0.04   ABSOLUTE BASOPHILS 0.0 - 0.2 10e9/L - - -   ABSOLUTE BASOPHILS (EXTERNAL) 0.00 - 0.20 K/UL - - 0.02   ABS IMMATURE GRANULOCYTES 0 - 0.4 10e9/L - - -     Liver Latest Ref Rng & Units 2/15/2018 3/31/2017 6/28/2016   AP 40 - 150 U/L - - -   AP (external) 50 - 136 U/L 65 80 67   TBili 0.2 - 1.3 mg/dL - - -   TBili (external) 0.2 - 1.0 MG/DL 0.4 0.6 0.5   ALT 0 - 70 U/L - - -   ALT (external) 16 - 61 U/L 31 33 43   AST 0 - 45 U/L - - -   AST (external) 15 - 37 U/L 25 17 24   Tot Protein 6.8 - 8.8 g/dL - - -   Tot Protein (external) 6.4 - 8.2 G/DL 7.3 7.3 7.1   Albumin 3.9 - 5.1 g/dL - - -   Albumin (external) 3.4 - 5.0 G/DL 4.0 4.0 3.9        Iron studies Latest Ref Rng & Units 3/27/2014   Iron 35 - 180 ug/dL 43   Iron sat 15 - 46 % 14(L)     [facility] Txp Virology Latest Ref Rng & Units 9/3/2019 2/20/2019 2/15/2018   BK Quant Log Ext log copies/mL <2.6 <2.6 <2.6   BK Quant Result Ext Copies/mL <390 <390 <390   BK Quant Spec Ext - - - BLOOD   EBV VCA IGG ANTIBODY U/mL - - -   EBV CAPSID ANTIBODY IGG 0.0 - 0.8 AI - - -   Hep B Core NEG - - -   Hep B Core Ext Negative - - -   Hep B Surf - - - -   HIV 1&2 NEG - - -          Again,  thank you for allowing me to participate in the care of your patient.      Sincerely,    Bradly Bliss MD

## 2022-10-24 NOTE — PROGRESS NOTES
Christiano is a 62 year old who is being evaluated via a billable video visit.      How would you like to obtain your AVS? MyChart  If the video visit is dropped, the invitation should be resent by: Send to e-mail at: [email or web address]  Will anyone else be joining your video visit? No   No BP taken        Video-Visit Details    Video Start Time: 0803    Type of service:  Video Visit    Video End Time:0821    Originating Location (pt. 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   - Changes: Yes - With HLA identical kidney and almost 9 years out from transplant with lower mycophenolate mofetil to 500 mg every 12 hours.    # Infection Prophylaxis:   - PJP: Sulfa/TMP (Bactrim)    # Hypertension: Not checked recently;  Goal BP: < 130/80   - Changes: Yes - With elevated blood glucose and some shortness of breath with exercise along with a bit lower exercise tolerance, will look to start ARB and possibly taper off beta blocker.  Will decrease metoprolol to 50 mg bid and start losartan 25 mg nightly.    # Elevated Blood Glucose: Glucose generally running ~ 110-120s Last HbA1c: 6.0% on 9/2021   - Management as per primary care.   - Will start ARB.    # Mineral Bone Disorder:   - Vitamin D; level: Not checked recently        On supplement: No  - Calcium; level: Normal        On  supplement: No    # Chronic Cough: Stable symptoms.    # GERD: Controlled on PPI.    # H/o Left Atrial Myxoma, s/p Resection: Patient had incidental finding of left atrial myxoma as part of his transplant evaluation 2014.  He underwent minimally invasive surgical resection.  No issues since.    # Skin Cancer Risk:    - Discussed sun protection and recommend regular follow up with Dermatology.    # Medical Compliance: No.  Evidence of labs less than recommended and infrequent transplant follow-up.  - Discussed importance of checking labs regularly as recommended, taking medications as prescribed and attending scheduled medical appointments.    # COVID-19 Virus Review: Discussed COVID-19 virus and the potential medical risks.  Reviewed preventative health recommendations, including wearing a mask where appropriate.  Recommended COVID vaccination should be up to date with either an initial vaccination or booster shot when appropriate.  Asked the patient to inform the transplant center if they are exposed or diagnosed with this virus.    # COVID Vaccination Up To Date: Not vaccinated     # Transplant History:  Etiology of Kidney Failure: IgA nephropathy  Tx: LDKT  Transplant: 3/20/2014 (Kidney)  Significant changes in immunosuppression: Decreased immunosuppression due to HLA identical kidney from his brother  Significant transplant-related complications: None      Transplant Office Phone Number: [phone]    Assessment and plan was discussed with the patient and he voiced his understanding and agreement.    Return visit: Return in about 1 year (around 10/24/2023).    Bradly Bliss MD      Reason for Consult   Christiano Souza was seen in consultation at the request of Dr. Lopez for kidney transplant and immunosuppression management.    Chief Complaint   Mr. Souza is a 62 year old here for kidney transplant and immunosuppression management.    History of Present Illness    Mr. Souza reports feeling good  overall with some medical complaints.  Since last clinic visit in 2019, patient reports no hospitalizations or new medical complaints and has been doing well overall.  His energy level is good and remains normal.  He is active and does get some exercise.  Denies any chest pain, but some shortness of breath with exertion, which is stable.  No palpitations.  No leg swelling.    Appetite is okay and weight has been stable.  No nausea or vomiting.  Occasional loose stools.  Heartburn symptoms are controlled on omeprazole.  No fever, sweats or chills.  No night sweats.    Recent Hospitalizations:  [x] No [] Yes    New Medical Issues: [x] No [] Yes    Decreased energy: [x] No [] Yes    Chest pain or SOB with exertion:  [] No [x] Yes Some POWER   Appetite change or weight change: [x] No [] Yes    Nausea, vomiting or diarrhea:  [] No [x] Yes Occasional loose stools.   Fever, sweats or chills: [x] No [] Yes    Leg swelling: [x] No [] Yes      Home BP: Not checked    Review of Systems   A comprehensive review of systems was obtained and negative, except as noted in the HPI or PMH.    Problem List   Patient Active Problem List   Diagnosis     HTN, kidney transplant related     Dyslipidemia     Nephrolithiasis     Lupus anticoagulant positive     Atrial myxoma     Kidney replaced by transplant     Immunosuppression (H)     Vitamin D deficiency     Chronic kidney disease, stage 3a (H)     Aftercare following organ transplant     Need for pneumocystis prophylaxis     GERD (gastroesophageal reflux disease)       Past Medical History    Past Medical History:   Diagnosis Date     Atrial myxoma 11/2013     ESRD on peritoneal dialysis (H)      History of nephrolithiasis 1994    History of nephrolithiasis with lithotripsy 2003     HTN (hypertension) 1-2013     Hyperlipidemia 2000     Hypertriglyceridemia      IgA nephropathy      Lupus anticoagulant positive 11/2103       Past Surgical History   Past Surgical History:   Procedure  Laterality Date     ANGIOGRAM  3/20/2014    Procedure: ANGIOGRAM;  Transplant Nephroureteral Stent Placement with Fluoroscopy and Ultrasound;  Surgeon: Curly Sherman MD;  Location: [facility] OR     Left atrial myxoma resection - laprascopic  12/10/13     PERITONEAL DIALYSIS  2013     TRANSPLANT KIDNEY RECIPIENT LIVING RELATED  3/20/2014    Procedure: TRANSPLANT KIDNEY RECIPIENT LIVING RELATED;  Living Related Left Kidney Transplant Recipient Percutaneous Nephrostomy Tube Placement;  Surgeon: Richy Snyder MD;  Location: [facility] OR       Family History   Family History   Problem Relation Age of Onset     Osteoporosis Sister      Cancer Maternal Grandmother          later 70's, bone cancer     Cancer Paternal Grandmother          late 70's, stomach cancer     Cancer Paternal Grandfather          age 68 cancer?     Cardiovascular Maternal Grandfather          late 70's, CHF, dementia        Social History   Social History     Tobacco Use     Smoking status: Never     Smokeless tobacco: Never   Substance Use Topics     Alcohol use: Yes     Alcohol/week: 2.5 standard drinks     Types: 3 drink(s) per week     Drug use: No       Allergies   No Known Allergies    Medications   Current Outpatient Medications   Medication Sig     losartan (COZAAR) 25 MG tablet Take 1 tablet (25 mg) by mouth At Bedtime     metoprolol tartrate (LOPRESSOR) 100 MG tablet Take 0.5 tablets (50 mg) by mouth 2 times daily     mycophenolate (GENERIC EQUIVALENT) 250 MG capsule Take 2 capsules (500 mg) by mouth 2 times daily     Omega-3 Fatty Acids (FISH OIL) 1000 MG CPDR Take 2 capsules by mouth daily     omeprazole (PRILOSEC) 20 MG capsule Take 40 mg by mouth daily      sulfamethoxazole-trimethoprim (BACTRIM) 400-80 MG tablet Take 1 tablet by mouth daily     tacrolimus (GENERIC EQUIVALENT) 0.5 MG capsule Take 2 capsules (1 mg) by mouth 2 times daily Total dose: 1 mg in the AM, 0.5 mg in the PM     No current facility-administered  medications for this visit.     Medications Discontinued During This Encounter   Medication Reason     sulfamethoxazole-trimethoprim (BACTRIM) 400-80 MG tablet      metoprolol (LOPRESSOR) 50 MG tablet      sulfamethoxazole-trimethoprim (BACTRIM) 400-80 MG tablet      mycophenolate (GENERIC EQUIVALENT) 250 MG capsule      tacrolimus (GENERIC EQUIVALENT) 0.5 MG capsule        Physical Exam   Vital Signs: Wt 74.8 kg (165 lb)   BMI 26.63 kg/m      GENERAL APPEARANCE: alert and no distress  HENT: no obvious abnormalities on appearance  RESP: breathing appears unremarkable with normal rate, no audible wheezing or cough and no apparent shortness of breath with conversation  MS: extremities normal - no gross deformities noted  SKIN: no apparent rash and normal skin tone  NEURO: speech is clear with no obvious neurological deficits  PSYCH: mentation appears normal and affect normal    Data     Renal Latest Ref Rng & Units 10/3/2022 6/23/2022 9/24/2021   Na 133 - 144 mmol/L - - -   Na (external) 136 - 145 mmol/L 137 139 137   K 3.4 - 5.3 mmol/L - - -   K (external) 3.5 - 5.1 mmol/L 3.8 3.6 3.9   Cl 102 - 111 mmol/L 107 108 108   CO2 20 - 32 mmol/L - - -   CO2 (external) 21 - 32 mmol/L 22 22 23   BUN 7 - 30 mg/dL - - -   BUN (external) 7 - 18 mg/dL 22(H) 11 11   Cr 0.66 - 1.25 mg/dL - - -   Cr (external) 0.70 - 1.30 mg/dL 1.34(H) 1.44(H) 1.34   Glucose 60 - 99 mg/dL - - -   Glucose (external) 70 - 100 mg/dL 122(H) 113(H) 98   Ca  8.5 - 10.4 mg/dL - - -   Ca (external) 8.5 - 10.1 mg/dL 8.9 9.1 9.0   Mg 1.6 - 2.3 mg/dL - - -   Mg (external) 1.8 - 2.4 MG/DL - - -     Bone Health Latest Ref Rng & Units 4/14/2014 3/27/2014 3/24/2014   Phos 2.5 - 4.5 mg/dL 3.5 3.2 2.8   PTHi 12 - 72 pg/mL - 79(H) -   Vit D Def 30 - 75 ug/L - 28(L) -     Heme Latest Ref Rng & Units 10/3/2022 6/23/2022 9/24/2021   WBC 4.0 - 11.0 10e9/L - - -   WBC (external) 3.50 - 11.50 K/uL - - 5.06   Hgb 13.3 - 17.7 g/dL - - -   Hgb (external) 14.0 - 18.0 g/dL  14.8 15.4 15.9   Plt 150 - 450 10e9/L - - -   Plt (external) 150 - 400 K/uL 209 232 203   ABSOLUTE NEUTROPHIL 1.6 - 8.3 10e9/L - - -   ABSOLUTE NEUTROPHILS (EXTERNAL) 2.0 - 8.0 K/uL - - 3.49   ABSOLUTE LYMPHOCYTES 0.8 - 5.3 10e9/L - - -   ABSOLUTE LYMPHOCYTES (EXTERNAL) 0.60 - 5.20 K/uL - - 0.91   ABSOLUTE MONOCYTES 0.0 - 1.3 10e9/L - - -   ABSOLUTE MONOCYTES (EXTERNAL) 0.00 - 1.00 K/uL - - 0.52   ABSOLUTE EOSINOPHILS 0.0 - 0.7 10e9/L - - -   ABSOLUTE EOSINOPHILS (EXTERNAL) 0.00 - 60 K/uL - - 0.04   ABSOLUTE BASOPHILS 0.0 - 0.2 10e9/L - - -   ABSOLUTE BASOPHILS (EXTERNAL) 0.00 - 0.20 K/UL - - 0.02   ABS IMMATURE GRANULOCYTES 0 - 0.4 10e9/L - - -     Liver Latest Ref Rng & Units 2/15/2018 3/31/2017 6/28/2016   AP 40 - 150 U/L - - -   AP (external) 50 - 136 U/L 65 80 67   TBili 0.2 - 1.3 mg/dL - - -   TBili (external) 0.2 - 1.0 MG/DL 0.4 0.6 0.5   ALT 0 - 70 U/L - - -   ALT (external) 16 - 61 U/L 31 33 43   AST 0 - 45 U/L - - -   AST (external) 15 - 37 U/L 25 17 24   Tot Protein 6.8 - 8.8 g/dL - - -   Tot Protein (external) 6.4 - 8.2 G/DL 7.3 7.3 7.1   Albumin 3.9 - 5.1 g/dL - - -   Albumin (external) 3.4 - 5.0 G/DL 4.0 4.0 3.9        Iron studies Latest Ref Rng & Units 3/27/2014   Iron 35 - 180 ug/dL 43   Iron sat 15 - 46 % 14(L)     [facility] Txp Virology Latest Ref Rng & Units 9/3/2019 2/20/2019 2/15/2018   BK Quant Log Ext log copies/mL <2.6 <2.6 <2.6   BK Quant Result Ext Copies/mL <390 <390 <390   BK Quant Spec Ext - - - BLOOD   EBV VCA IGG ANTIBODY U/mL - - -   EBV CAPSID ANTIBODY IGG 0.0 - 0.8 AI - - -   Hep B Core NEG - - -   Hep B Core Ext Negative - - -   Hep B Surf - - - -   HIV 1&2 NEG - - -

## 2022-10-27 ENCOUNTER — MYC MEDICAL ADVICE (OUTPATIENT)
Dept: TRANSPLANT | Facility: CLINIC | Age: 62
End: 2022-10-27

## 2022-10-27 NOTE — LETTER
PHYSICIAN ORDERS      DATE & TIME ISSUED: 10/27/2022 2142  PATIENT NAME: Christiano Souza   : 1960     [facility] MR# [if applicable]: [id number]     DIAGNOSIS:  Kidney Transplant  ICD-10 CODE: Z94.0     Please repeat the following lab with next lab draw  Vitamin D    Any questions please call: [phone]  Please fax lab results to [phone].    .

## 2022-10-28 NOTE — TELEPHONE ENCOUNTER
Bradly Bliss MD Sveiven, Sara, RN  I lowered his mycophenolate due to HLA identical kidney.     Would check vitamin D level.     I made some changes to his BP meds.  Patient should check his BP and follow up with PCP.     Bro     OUTCOME: VIt D lab order faxed to local lab.  See my chart message regarding BP and follow up with PCP    Celena Page RN   Transplant Coordinator  [phone]

## 2022-11-16 ENCOUNTER — MYC MEDICAL ADVICE (OUTPATIENT)
Dept: TRANSPLANT | Facility: CLINIC | Age: 62
End: 2022-11-16

## 2022-11-29 ENCOUNTER — TELEPHONE (OUTPATIENT)
Dept: TRANSPLANT | Facility: CLINIC | Age: 62
End: 2022-11-29

## 2022-11-29 DIAGNOSIS — E55.9 VITAMIN D DEFICIENCY: Primary | ICD-10-CM

## 2022-11-29 NOTE — TELEPHONE ENCOUNTER
ISSUE: vit D level low, 17    PLAN: Per Dr. Bliss, start cholecalciferol 25 mcg daily.    [facility] called pt, outgoing message states that he will not respond to VMs. MyC messcent sent with new prescription info and rx sent to local pharmacy.

## 2023-01-21 ENCOUNTER — HEALTH MAINTENANCE LETTER (OUTPATIENT)
Age: 63
End: 2023-01-21

## 2023-11-15 ENCOUNTER — MYC REFILL (OUTPATIENT)
Dept: NEPHROLOGY | Facility: CLINIC | Age: 63
End: 2023-11-15

## 2023-11-15 DIAGNOSIS — Z79.899 ENCOUNTER FOR LONG-TERM CURRENT USE OF MEDICATION: ICD-10-CM

## 2023-11-15 DIAGNOSIS — Z98.890 OTHER SPECIFIED POSTPROCEDURAL STATES: ICD-10-CM

## 2023-11-15 DIAGNOSIS — Z48.298 AFTERCARE FOLLOWING ORGAN TRANSPLANT: ICD-10-CM

## 2023-11-15 DIAGNOSIS — Z94.0 KIDNEY REPLACED BY TRANSPLANT: Primary | ICD-10-CM

## 2023-11-15 DIAGNOSIS — Z29.89 NEED FOR PNEUMOCYSTIS PROPHYLAXIS: ICD-10-CM

## 2023-11-15 RX ORDER — SULFAMETHOXAZOLE AND TRIMETHOPRIM 400; 80 MG/1; MG/1
1 TABLET ORAL DAILY
Qty: 90 TABLET | Refills: 0 | Status: SHIPPED | OUTPATIENT
Start: 2023-11-15 | End: 2024-01-15

## 2023-11-15 RX ORDER — TACROLIMUS 0.5 MG/1
1 CAPSULE ORAL 2 TIMES DAILY
Qty: 120 CAPSULE | Refills: 0 | Status: SHIPPED | OUTPATIENT
Start: 2023-11-15 | End: 2023-12-20

## 2023-12-20 DIAGNOSIS — Z29.89 NEED FOR PNEUMOCYSTIS PROPHYLAXIS: ICD-10-CM

## 2023-12-20 DIAGNOSIS — Z94.0 KIDNEY REPLACED BY TRANSPLANT: Primary | ICD-10-CM

## 2023-12-20 RX ORDER — TACROLIMUS 0.5 MG/1
CAPSULE ORAL
Qty: 90 CAPSULE | Refills: 0 | Status: SHIPPED | OUTPATIENT
Start: 2023-12-20 | End: 2024-01-15

## 2024-01-15 DIAGNOSIS — I15.1 HTN, KIDNEY TRANSPLANT RELATED: ICD-10-CM

## 2024-01-15 DIAGNOSIS — Z29.89 NEED FOR PNEUMOCYSTIS PROPHYLAXIS: ICD-10-CM

## 2024-01-15 DIAGNOSIS — E55.9 VITAMIN D DEFICIENCY: ICD-10-CM

## 2024-01-15 DIAGNOSIS — Z94.0 HTN, KIDNEY TRANSPLANT RELATED: ICD-10-CM

## 2024-01-15 DIAGNOSIS — Z94.0 KIDNEY REPLACED BY TRANSPLANT: Primary | ICD-10-CM

## 2024-01-15 RX ORDER — VITAMIN B COMPLEX
1 TABLET ORAL DAILY
Qty: 90 TABLET | Refills: 0 | Status: SHIPPED | OUTPATIENT
Start: 2024-01-15 | End: 2024-04-01

## 2024-01-15 RX ORDER — LOSARTAN POTASSIUM 25 MG/1
25 TABLET ORAL AT BEDTIME
Qty: 90 TABLET | Refills: 0 | Status: SHIPPED | OUTPATIENT
Start: 2024-01-15 | End: 2024-04-01

## 2024-01-15 RX ORDER — MYCOPHENOLATE MOFETIL 250 MG/1
500 CAPSULE ORAL 2 TIMES DAILY
Qty: 360 CAPSULE | Refills: 3 | Status: SHIPPED | OUTPATIENT
Start: 2024-01-15

## 2024-01-15 RX ORDER — SULFAMETHOXAZOLE AND TRIMETHOPRIM 400; 80 MG/1; MG/1
1 TABLET ORAL DAILY
Qty: 90 TABLET | Refills: 3 | Status: SHIPPED | OUTPATIENT
Start: 2024-01-15

## 2024-01-15 RX ORDER — TACROLIMUS 0.5 MG/1
CAPSULE ORAL
Qty: 90 CAPSULE | Refills: 11 | Status: SHIPPED | OUTPATIENT
Start: 2024-01-15 | End: 2024-03-04

## 2024-02-18 ENCOUNTER — HEALTH MAINTENANCE LETTER (OUTPATIENT)
Age: 64
End: 2024-02-18

## 2024-03-04 ENCOUNTER — MYC REFILL (OUTPATIENT)
Dept: NEPHROLOGY | Facility: CLINIC | Age: 64
End: 2024-03-04

## 2024-03-04 DIAGNOSIS — Z94.0 HTN, KIDNEY TRANSPLANT RELATED: ICD-10-CM

## 2024-03-04 DIAGNOSIS — Z94.0 KIDNEY REPLACED BY TRANSPLANT: ICD-10-CM

## 2024-03-04 DIAGNOSIS — E55.9 VITAMIN D DEFICIENCY: ICD-10-CM

## 2024-03-04 DIAGNOSIS — Z29.89 NEED FOR PNEUMOCYSTIS PROPHYLAXIS: ICD-10-CM

## 2024-03-04 DIAGNOSIS — I15.1 HTN, KIDNEY TRANSPLANT RELATED: ICD-10-CM

## 2024-03-04 RX ORDER — TACROLIMUS 0.5 MG/1
CAPSULE ORAL
Qty: 90 CAPSULE | Refills: 3 | Status: SHIPPED | OUTPATIENT
Start: 2024-03-04

## 2024-04-01 DIAGNOSIS — Z94.0 HTN, KIDNEY TRANSPLANT RELATED: ICD-10-CM

## 2024-04-01 DIAGNOSIS — Z29.89 NEED FOR PNEUMOCYSTIS PROPHYLAXIS: ICD-10-CM

## 2024-04-01 DIAGNOSIS — Z94.0 KIDNEY REPLACED BY TRANSPLANT: ICD-10-CM

## 2024-04-01 DIAGNOSIS — E55.9 VITAMIN D DEFICIENCY: ICD-10-CM

## 2024-04-01 DIAGNOSIS — I15.1 HTN, KIDNEY TRANSPLANT RELATED: ICD-10-CM

## 2024-04-01 RX ORDER — LOSARTAN POTASSIUM 25 MG/1
25 TABLET ORAL
Qty: 90 TABLET | Refills: 0 | Status: SHIPPED | OUTPATIENT
Start: 2024-04-01 | End: 2024-07-15

## 2024-04-01 RX ORDER — CHOLECALCIFEROL (VITAMIN D3) 25 MCG
1 TABLET ORAL DAILY
Qty: 90 TABLET | Refills: 0 | Status: SHIPPED | OUTPATIENT
Start: 2024-04-01 | End: 2024-07-15

## 2024-07-02 NOTE — LETTER
Detail Level: Zone [facility]  Room 2-200  [facility],  [address]  [address]  Tel [phone]  Toll Free [phone]                OUTPATIENT LABORATORY TEST ORDER    Patient Name: Christiano Souza  Transplant Date: 3/20/2014 (Kidney)  YOB: 1960  Issue Date & Time:February 14, 20189:40 AM    [facility] MR:  [id number]  Exp. Date (1 year after date issued)      Diagnoses: Kidney Transplant (ICD-10  Z94.0)   Long term use of medications (ICD-10  Z79.899)     Lab results to be available on the same day drawn.   Patient should release information to the [facility], [facility].  Please fax to the Transplant Center at [phone].    Monthly    ?Hemogram and Platelet   ?Basic Metabolic Panel (Sodium, Potassium, Chloride, CO2, Creatinine, BUN, Glucose, Calcium)   ?/Tacrolimus/Prograf drug level (mail to [facility] - mailers and instructions provided by the patient)                      Every 6 Months                   ?BK (Polyoma Virus) PCR Quantitative - Plasma                                            ?Urine for protein/creatinine   ? PRA/DSA level (mailers provided by the patient)       HIV, HBsAb, HBcAb, HCV  If you have any questions, please call [facility] at [phone] or [phone].    Please fax labs to [phone]

## 2024-07-14 DIAGNOSIS — E55.9 VITAMIN D DEFICIENCY: ICD-10-CM

## 2024-07-14 DIAGNOSIS — I15.1 HTN, KIDNEY TRANSPLANT RELATED: ICD-10-CM

## 2024-07-14 DIAGNOSIS — Z94.0 HTN, KIDNEY TRANSPLANT RELATED: ICD-10-CM

## 2024-07-14 DIAGNOSIS — Z94.0 KIDNEY REPLACED BY TRANSPLANT: ICD-10-CM

## 2024-07-14 DIAGNOSIS — Z29.89 NEED FOR PNEUMOCYSTIS PROPHYLAXIS: ICD-10-CM

## 2024-07-15 ENCOUNTER — TELEPHONE (OUTPATIENT)
Dept: TRANSPLANT | Facility: CLINIC | Age: 64
End: 2024-07-15

## 2024-07-15 DIAGNOSIS — Z94.0 KIDNEY REPLACED BY TRANSPLANT: ICD-10-CM

## 2024-07-15 DIAGNOSIS — E55.9 VITAMIN D DEFICIENCY: ICD-10-CM

## 2024-07-15 DIAGNOSIS — Z29.89 NEED FOR PNEUMOCYSTIS PROPHYLAXIS: ICD-10-CM

## 2024-07-15 DIAGNOSIS — I15.1 HTN, KIDNEY TRANSPLANT RELATED: ICD-10-CM

## 2024-07-15 DIAGNOSIS — Z94.0 HTN, KIDNEY TRANSPLANT RELATED: ICD-10-CM

## 2024-07-15 DIAGNOSIS — Z94.0 S/P KIDNEY TRANSPLANT: Primary | ICD-10-CM

## 2024-07-15 RX ORDER — CHOLECALCIFEROL (VITAMIN D3) 25 MCG
1 TABLET ORAL DAILY
Qty: 90 TABLET | Refills: 0 | Status: SHIPPED | OUTPATIENT
Start: 2024-07-15

## 2024-07-15 RX ORDER — LOSARTAN POTASSIUM 25 MG/1
25 TABLET ORAL DAILY
Qty: 90 TABLET | Refills: 3 | Status: SHIPPED | OUTPATIENT
Start: 2024-07-15

## 2024-07-15 NOTE — TELEPHONE ENCOUNTER
ISSUE: received refill request on Vitamin D supplement.  Last labs obtained 12/2023, last vitamin D level 2022.    PLAN: patient to obtain transplant labs as well as vitamin D level    OUTCOME: patient V/U of obtaining transplant labs as well as Vitamin D level.  Discussed that he has lab orders for Q3 months.  He is overdue for SOT apt,  order placed.  Christiano states he will be in [place] in September or October as he lives in [place]. He is aware he must be in the state to conduct a visit.    He is following with local gen neph.      Celena Page RN   Transplant Coordinator  [phone]

## 2024-07-15 NOTE — LETTER
OUTPATIENT LABORATORY TEST ORDER     Patient Name: Christiano Souza   YOB: 1960     [facility] MR# [if applicable]: [id number]   Date & Time: July 15, 2024 0846  Expiration Date: 1 year after date issued      Diagnoses: Kidney Transplant (ICD-10 Z94.0)   Long term use of medications (ICD-10 Z79.899)             Contact with or exposure to viral disease (Z20.828)            Aftercare following organ transplant (Z48.298)   Other specified postprocedural states (Z98.890)     We ask your assistance in obtaining the following laboratory tests, which are part of our routine surveillance program for Solid Organ Transplant patients.     Please fax each result to [phone], same day as resulted/available    Critical lab results page [phone]    Please obtained a vitamin D level with next lab draw    Every 3 months  CBC with platelets  Basic Metabolic Panel (Sodium, Potassium, Chloride, Creatinine, CO2, Urea Nitrogen, glucose, Calcium)  Tacrolimus drug level - 12-hour trough, please document time of last dose      Yearly  Urine for protein/creatinine       If you have any questions, please call [facility] [phone] or [phone], Fax [phone].    .

## 2024-08-26 ENCOUNTER — TRANSFERRED RECORDS (OUTPATIENT)
Dept: HEALTH INFORMATION MANAGEMENT | Facility: CLINIC | Age: 64
End: 2024-08-26

## 2024-10-01 ENCOUNTER — TRANSFERRED RECORDS (OUTPATIENT)
Dept: HEALTH INFORMATION MANAGEMENT | Facility: CLINIC | Age: 64
End: 2024-10-01

## 2024-10-01 ENCOUNTER — RESULTS ONLY (OUTPATIENT)
Dept: LAB | Facility: CLINIC | Age: 64
End: 2024-10-01

## 2024-10-02 ENCOUNTER — TELEPHONE (OUTPATIENT)
Dept: TRANSPLANT | Facility: CLINIC | Age: 64
End: 2024-10-02

## 2024-10-02 LAB
% BASOPHILS (EXTERNAL): 0.3 %
% EOSINOPHILS (EXTERNAL): 0.9 %
% IMMATURE GRANULOCYTES (EXTERNAL): 1.3 %
% LYMPHOCYTES (EXTERNAL): 32.8 %
% MONOCYTES (EXTERNAL): 9.5 %
% NEUTROPHILS (EXTERNAL): 55.2 %
ABSOLUTE BASOPHILS (EXTERNAL): 0.02 K/UL (ref 0–0.2)
ABSOLUTE EOSINOPHILS (EXTERNAL): 0.06 K/UL (ref 0–0.8)
ABSOLUTE IMMATURE GRANULOCYTES (EXTERNAL): 0.08 K/UL (ref 0–0.09)
ABSOLUTE LYMPHOCYTES (EXTERNAL): 2.1 K/UL (ref 0.6–5.2)
ABSOLUTE MONOCYTES (EXTERNAL): 0.61 K/UL (ref 0–1)
ABSOLUTE NEUTROPHILS (EXTERNAL): 3.53 K/UL (ref 2–8)
NUCLEATED RBCS (EXTERNAL): 0 %

## 2024-10-02 NOTE — LETTER
PHYSICIAN ORDERS      DATE & TIME ISSUED: October 3, 2024 6:33 PM  PATIENT NAME: Christiano Souza   : 1960     [facility] MR# [if applicable]: [id number]     DIAGNOSIS:  Kidney transplant   ICD-10 CODE: Z94.0      Please repeat the following level in one week  Urine protein creatinine ratio  BMP    Any questions please call: [phone] option 5    Please fax each result same day as resulted/available    Critical lab results page [phone]    Please fax results to [phone].    .

## 2024-10-02 NOTE — TELEPHONE ENCOUNTER
Issue:  Cr 1.7, baseline 1.2-1.4.    Plan:  Bradly Bliss MD Sveiven, Sara, RN  Elevated serum creatinine and recommend usual assessment for fever, recent illness, pain over kidney allograft, blood pressure and volume status, as well as would just follow for now and repeat labs.    Call placed Christiano Souza. Reports BP and weight stable. He did have vertigo a couple weeks ago but symptoms are improving. He is eating and drinking as he normally does. Christiano denies all other illness symptoms and new meds.     He does have follow up visit next week with Dr. Dominguez. He lives in [place] but is staying with his brother in [facility] until November. He will repeat labs next week in [place]. Will send lab orders to [facility] and to pt via "[employer].     LPN task:  Please send check BMP and UPC to [facility] and to pt via "[employer]. Thanks.

## 2024-10-07 NOTE — PROGRESS NOTES
Virtual Visit Details    Type of service:  Video Visit     Originating Location (pt. Location): Home    Distant Location (provider location):  On-site  [facility]     Assessment & Plan   # LDKT: CKD Stage 3a - Trend up   - Baseline Creatinine: ~ 1.2-1.4 in the past (2023), new labs in 2024 with Cr ~ 1.5-1.7 mg/dL.   - Proteinuria: Normal (<0.2 grams)     - DSA Hx: Not checked recently due to time from transplant     - Last cPRA: 87%   - BK Viremia: No   - Kidney Tx Biopsy Hx: No biopsy history.    No ibuprofen, Advil or other NSAIDs. No obstructive symptoms. BP hasn't been low and no recent illness. He had colonoscopy recently, but high phosphorous preparation wasn't used. I've asked him to repeat labs in one week: CBC, BMP, Tacrolimus level, UA, UPCR, DSA, Allosure. If creatinine remains elevated we'll do an US. If creatinine still high and US normal, then we'll proceed with biopsy.    # IgA Nephropathy: No proteinuria. NO recent UA to assess for hematuria.    # Immunosuppression: Tacrolimus immediate release (goal 3-5) and Mycophenolate mofetil (dose 500 mg every 12 hours)   - Induction with Recent Transplant:  Not known due to time from transplant   - Continue with intensive monitoring of immunosuppression for efficacy and toxicity.   - Historical Changes in Immunosuppression:  Low IS given HLA identical transplant from brother.   - Changes: Not at this time    # Infection Prevention:      - PJP: Sulfa/TMP (Bactrim)      - CMV IgG Ab High Risk Discordance (D+/R-): No  CMV Serostatus: Positive  - EBV IgG Ab High Risk Discordance (D+/R-): No  EBV Serostatus: Positive    # Hypertension: Controlled;  Goal BP: < 130/80   - Losartan 25 mg every day and Metoprolol tartrate 50 mg twice a day.   - Changes: Not at this time    # Pre-Diabetes: Controlled (HbA1c <7%) Last HbA1c: 6.1% (01/2024)     # Mineral Bone Disorder:    - Secondary renal hyperparathyroidism;  PTH level: Not checked recently        On treatment: None  - Vitamin D; level: Not checked recently        On supplement: Yes vitamin D3 1,000 units every day.  - Calcium; level: Normal        On supplement: No  - Phosphorus; level: Not checked recently        On supplement: No    # Electrolytes:   - Potassium; level: Normal        On supplement: No  - Magnesium; level: Not checked recently        On supplement: No  - Bicarbonate; level: Normal        On supplement: No    # Other Significant PMH:  - Chronic Cough: Stable symptoms.     - GERD: Controlled on PPI.     - H/o Left Atrial Myxoma, s/p Resection: Patient had incidental finding of left atrial myxoma as part of his transplant evaluation 2014.  He underwent minimally invasive surgical resection.  No issues since.     # Skin Cancer Risk: Basal cell cancer x 2: lesions in arm and right temple were removed in March, 2024. Follow up in one year was recommended.    - Discussed sun protection and recommend regular follow up with Dermatology.    # Health maintenance: Had a colonoscopy performed last month, some polyps removed, follow up in 5 years was recommended.     # Transplant History:  Etiology of Kidney Failure: IgA nephropathy  Tx: LDKT  Transplant: 3/20/2014 (Kidney)  Significant transplant-related complications: None    Transplant Office Phone Number: [phone]    Assessment and plan was discussed with the patient and he voiced his understanding and agreement.    Return visit: Return in about 1 year (around 10/8/2025).    Antonio Hooks MD    The longitudinal plan of care for the diagnosis(es)/condition(s) as documented were addressed during this visit. Due to the added complexity in care, I will continue to support Christiano in the subsequent management and with ongoing continuity of care.      Chief Complaint   Mr. Souza is a 64 year old here for kidney transplant and immunosuppression management.     History of Present Illness    Mr. Souza  reports feeling good overall.    Since last clinic visit:   Hospitalizations: No   New Medical Issues: Yes, recent PT for shoulder pain. Had a colonoscopy this year. A few polyps were removed, followed up in 5 years. Had 2 skin spots removed, arm and forehead, both with complete excision, next follow up in January, 2024. Last skin cancer was 3 years.    Activity: walking everyday.   Chest pain or shortness of breath: No  Lower extremity swelling: No  Weight change: No  Nausea and vomiting: Yes, nausea mostly in AM.   Diarrhea: some loose stool after eating, bowel movements 3-4 times a day.  Heartburn symptoms: No  Fever, sweats or chills: No  Urinary complaints: No    Home BP:  130s/80s    Problem List   Patient Active Problem List   Diagnosis    HTN, kidney transplant related    Dyslipidemia    Nephrolithiasis    Lupus anticoagulant positive    Atrial myxoma    Kidney replaced by transplant    Immunosuppression (H)    Vitamin D deficiency    Chronic kidney disease, stage 3a (H)    Aftercare following organ transplant    Need for pneumocystis prophylaxis    GERD (gastroesophageal reflux disease)       Allergies   No Known Allergies    Medications   Current Outpatient Medications   Medication Sig Dispense Refill    losartan (COZAAR) 25 MG tablet Take 1 tablet (25 mg) by mouth daily 90 tablet 3    metoprolol tartrate (LOPRESSOR) 100 MG tablet Take 100 mg by mouth 2 times daily.      mycophenolate (GENERIC EQUIVALENT) 250 MG capsule Take 2 capsules (500 mg) by mouth 2 times daily 360 capsule 3    Omega-3 Fatty Acids (FISH OIL) 1000 MG CPDR Take 2 capsules by mouth daily      omeprazole (PRILOSEC) 20 MG capsule Take 40 mg by mouth daily       sulfamethoxazole-trimethoprim (BACTRIM) 400-80 MG tablet Take 1 tablet by mouth daily 90 tablet 3    tacrolimus (GENERIC) 0.5 MG capsule TAKE TWO CAPSULES BY MOUTH IN THE MORNING AND ONE IN THE EVENING 90 capsule 3    VITAMIN D3 25 MCG (1000 UT) tablet Take 1 tablet (25 mcg) by  "mouth daily 90 tablet 0     No current facility-administered medications for this visit.     There are no discontinued medications.    Physical Exam   Vital Signs: Ht 1.676 m (5' 6\")   Wt 74.8 kg (165 lb)   BMI 26.63 kg/m      GENERAL: alert and no distress  EYES: Eyes grossly normal to inspection.  No discharge or erythema, or obvious scleral/conjunctival abnormalities.  RESP: No audible wheeze, cough, or visible cyanosis.    SKIN: Visible skin clear. No significant rash, abnormal pigmentation or lesions.  NEURO: Cranial nerves grossly intact.  Mentation and speech appropriate for age.  PSYCH: Appropriate affect, tone, and pace of words      Data         Latest Ref Rng & Units 10/1/2024     8:09 AM 8/1/2024     8:00 AM 12/20/2023     8:18 AM   Renal   Na (external) 136 - 145 mmol/L 138  140  139  C      K (external) 3.4 - 5.1 mmol/L 3.9  4.0  4.0  C      Cl 101 - 112 mmol/L 109  109  106  C      Cl (external) 101 - 112 mmol/L 109  109  106  C      CO2 (external) 20 - 31 mmol/L 26  23  21  C      BUN (external) 9 - 23 mg/dL 15  12  12  C      Cr (external) 0.70 - 1.30 mg/dL 1.73  1.45  1.27  C      Glucose (external) 74 - 106 mg/dL 97  112  110  C      Ca (external) 8.7 - 10.4 mg/dL 9.9  9.8  9.3  C         C Corrected result    This result is from an external source.         Latest Ref Rng & Units 11/28/2022     7:38 AM 4/14/2014     7:20 AM 3/27/2014     7:40 AM   Bone Health   Phosphorus 2.5 - 4.5 mg/dL  3.5     Parathyroid Hormone Intact 12 - 72 pg/mL   79    Vit D Def 30 - 75 ug/L   28    Vit D Def (external) 30.0 - 100.0 ng/mL 17.0             This result is from an external source.         Latest Ref Rng & Units 10/1/2024     8:09 AM 8/1/2024     8:00 AM 12/20/2023     8:18 AM   Heme   WBC (external) 3.50 - 11.50 K/uL 6.40  8.17  7.32  C      Hgb (external) 14.0 - 18.0 g/dL 15.8  15.8  15.0  C      Plt (external) 150 - 400 K/uL 203  216  203  C      ABSOLUTE NEUTROPHILS (EXTERNAL) 2.00 - 8.00 K/uL 3.53  " 4.70     4.20  C      ABSOLUTE LYMPHOCYTES (EXTERNAL) 0.60 - 5.20 K/UL 2.10  2.51     2.28  C      ABSOLUTE MONOCYTES (EXTERNAL) 0.00 - 1.00 K/UL 0.61  0.74     0.65  C      ABSOLUTE EOSINOPHILS (EXTERNAL) 0.00 - 0.80 K/uL 0.06  0.08     0.10  C      ABSOLUTE BASOPHILS (EXTERNAL) 0.00 - 0.20 K/UL 0.02  0.02     0.02  C         C Corrected result    This result is from an external source.         Latest Ref Rng & Units 2/15/2018     8:10 AM 3/31/2017     7:47 AM 6/28/2016    12:00 AM   Liver   AP (external) 50 - 136 U/L 65     80     67       TBili (external) 0.2 - 1.0 MG/DL 0.4     0.6     0.5       ALT (external) 16 - 61 U/L 31     33     43       AST (external) 15 - 37 U/L 25     17     24       Tot Protein (external) 6.4 - 8.2 G/DL 7.3     7.3     7.1       Albumin (external) 3.4 - 5.0 G/DL 4.0     4.0     3.9           This result is from an external source.            Latest Ref Rng & Units 3/27/2014     7:40 AM   Iron studies   Iron 35 - 180 ug/dL 43    Iron Saturation Index 15 - 46 % 14          Latest Ref Rng & Units 9/3/2019     8:06 AM 2/20/2019     8:30 AM 2/15/2018     8:10 AM   [facility] Virology   BK Quant Log Ext log copies/mL <2.6     <2.6     <2.6       BK Quant Result Ext Copies/mL <390     <390     <390       BK Quant Spec Ext    BLOOD           This result is from an external source.     Failed to redirect to the Timeline version of the [employer].

## 2024-10-08 ENCOUNTER — VIRTUAL VISIT (OUTPATIENT)
Dept: TRANSPLANT | Facility: CLINIC | Age: 64
End: 2024-10-08
Attending: STUDENT IN AN ORGANIZED HEALTH CARE EDUCATION/TRAINING PROGRAM

## 2024-10-08 VITALS — WEIGHT: 165 LBS | BODY MASS INDEX: 26.52 KG/M2 | HEIGHT: 66 IN

## 2024-10-08 DIAGNOSIS — Z29.89 NEED FOR PNEUMOCYSTIS PROPHYLAXIS: ICD-10-CM

## 2024-10-08 DIAGNOSIS — N17.9 AKI (ACUTE KIDNEY INJURY) (H): ICD-10-CM

## 2024-10-08 DIAGNOSIS — Z94.0 HTN, KIDNEY TRANSPLANT RELATED: ICD-10-CM

## 2024-10-08 DIAGNOSIS — Z94.0 S/P KIDNEY TRANSPLANT: ICD-10-CM

## 2024-10-08 DIAGNOSIS — D84.9 IMMUNOSUPPRESSION (H): Primary | ICD-10-CM

## 2024-10-08 DIAGNOSIS — Z94.0 KIDNEY REPLACED BY TRANSPLANT: ICD-10-CM

## 2024-10-08 DIAGNOSIS — R80.1 PERSISTENT PROTEINURIA: ICD-10-CM

## 2024-10-08 DIAGNOSIS — E55.9 VITAMIN D DEFICIENCY: ICD-10-CM

## 2024-10-08 DIAGNOSIS — I15.1 HTN, KIDNEY TRANSPLANT RELATED: ICD-10-CM

## 2024-10-08 DIAGNOSIS — N18.31 CHRONIC KIDNEY DISEASE, STAGE 3A (H): ICD-10-CM

## 2024-10-08 PROCEDURE — G2211 COMPLEX E/M VISIT ADD ON: HCPCS | Mod: 95 | Performed by: STUDENT IN AN ORGANIZED HEALTH CARE EDUCATION/TRAINING PROGRAM

## 2024-10-08 PROCEDURE — 99214 OFFICE O/P EST MOD 30 MIN: CPT | Mod: 95 | Performed by: STUDENT IN AN ORGANIZED HEALTH CARE EDUCATION/TRAINING PROGRAM

## 2024-10-08 RX ORDER — VITAMIN B COMPLEX
1 TABLET ORAL DAILY
Qty: 90 TABLET | Refills: 3 | Status: SHIPPED | OUTPATIENT
Start: 2024-10-08 | End: 2025-10-08

## 2024-10-08 ASSESSMENT — PAIN SCALES - GENERAL: PAINLEVEL: NO PAIN (0)

## 2024-10-08 NOTE — PATIENT INSTRUCTIONS
Patient Recommendations:  - Repeat labs in one week.  - Recommend increased dietary fiber, such as psyllium, Metamucil, Benefiber or Citrucel.  Would also recommend trying probiotics.  - Recommend influenza vaccine and covid booster this fall.     Transplant Patient Information  Your Post Transplant Coordinator is: Celena Page  For non urgent items, we encourage you to contact your coordinator/care team online via [employer]  You and your care team can also contact your transplant coordinator Monday - Friday, 8am - 5pm at [phone] (Option 2 to reach the coordinator or Option 4 to schedule an appointment).  After hours for urgent matters, please call [facility] at [phone].

## 2024-10-08 NOTE — LETTER
10/8/2024      Christiano Souza  [address]  [place] [address]      Dear Colleague,    Thank you for referring your patient, Christiano Souza, to the [facility]. Please see a copy of my visit note below.    Virtual Visit Details    Type of service:  Video Visit     Originating Location (pt. Location): Home    Distant Location (provider location):  On-site  [facility]     Assessment & Plan  # LDKT: CKD Stage 3a - Trend up   - Baseline Creatinine: ~ 1.2-1.4 in the past (2023), new labs in 2024 with Cr ~ 1.5-1.7 mg/dL.   - Proteinuria: Normal (<0.2 grams)    - DSA Hx: Not checked recently due to time from transplant     - Last cPRA: 87%   - BK Viremia: No   - Kidney Tx Biopsy Hx: No biopsy history.    No ibuprofen, Advil or other NSAIDs. No obstructive symptoms. BP hasn't been low and no recent illness. He had colonoscopy recently, but high phosphorous preparation wasn't used. I've asked him to repeat labs in one week: CBC, BMP, Tacrolimus level, UA, UPCR, DSA, Allosure. If creatinine remains elevated we'll do an US. If creatinine still high and US normal, then we'll proceed with biopsy.    # IgA Nephropathy: No proteinuria. NO recent UA to assess for hematuria.    # Immunosuppression: Tacrolimus immediate release (goal 3-5) and Mycophenolate mofetil (dose 500 mg every 12 hours)   - Induction with Recent Transplant:  Not known due to time from transplant   - Continue with intensive monitoring of immunosuppression for efficacy and toxicity.   - Historical Changes in Immunosuppression:  Low IS given HLA identical transplant from brother.   - Changes: Not at this time    # Infection Prevention:      - PJP: Sulfa/TMP (Bactrim)      - CMV IgG Ab High Risk Discordance (D+/R-): No  CMV Serostatus: Positive  - EBV IgG Ab High Risk Discordance (D+/R-): No  EBV Serostatus: Positive    # Hypertension: Controlled;  Goal BP: <  130/80   - Losartan 25 mg every day and Metoprolol tartrate 50 mg twice a day.   - Changes: Not at this time    # Pre-Diabetes: Controlled (HbA1c <7%) Last HbA1c: 6.1% (01/2024)     # Mineral Bone Disorder:    - Secondary renal hyperparathyroidism; PTH level: Not checked recently        On treatment: None  - Vitamin D; level: Not checked recently        On supplement: Yes vitamin D3 1,000 units every day.  - Calcium; level: Normal        On supplement: No  - Phosphorus; level: Not checked recently        On supplement: No    # Electrolytes:   - Potassium; level: Normal        On supplement: No  - Magnesium; level: Not checked recently        On supplement: No  - Bicarbonate; level: Normal        On supplement: No    # Other Significant PMH:  - Chronic Cough: Stable symptoms.     - GERD: Controlled on PPI.     - H/o Left Atrial Myxoma, s/p Resection: Patient had incidental finding of left atrial myxoma as part of his transplant evaluation 2014.  He underwent minimally invasive surgical resection.  No issues since.     # Skin Cancer Risk: Basal cell cancer x 2: lesions in arm and right temple were removed in March, 2024. Follow up in one year was recommended.    - Discussed sun protection and recommend regular follow up with Dermatology.    # Health maintenance: Had a colonoscopy performed last month, some polyps removed, follow up in 5 years was recommended.     # Transplant History:  Etiology of Kidney Failure: IgA nephropathy  Tx: LDKT  Transplant: 3/20/2014 (Kidney)  Significant transplant-related complications: None    Transplant Office Phone Number: [phone]    Assessment and plan was discussed with the patient and he voiced his understanding and agreement.    Return visit: Return in about 1 year (around 10/8/2025).    Antonio Hooks MD    The longitudinal plan of care for the diagnosis(es)/condition(s) as documented were addressed during this visit. Due to the added complexity in care, I will  continue to support Christiano in the subsequent management and with ongoing continuity of care.      Chief Complaint  Mr. Souza is a 64 year old here for kidney transplant and immunosuppression management.     History of Present Illness   Mr. Souza reports feeling good overall.    Since last clinic visit:   Hospitalizations: No   New Medical Issues: Yes, recent PT for shoulder pain. Had a colonoscopy this year. A few polyps were removed, followed up in 5 years. Had 2 skin spots removed, arm and forehead, both with complete excision, next follow up in January, 2024. Last skin cancer was 3 years.    Activity: walking everyday.   Chest pain or shortness of breath: No  Lower extremity swelling: No  Weight change: No  Nausea and vomiting: Yes, nausea mostly in AM.   Diarrhea: some loose stool after eating, bowel movements 3-4 times a day.  Heartburn symptoms: No  Fever, sweats or chills: No  Urinary complaints: No    Home BP:  130s/80s    Problem List  Patient Active Problem List   Diagnosis     HTN, kidney transplant related     Dyslipidemia     Nephrolithiasis     Lupus anticoagulant positive     Atrial myxoma     Kidney replaced by transplant     Immunosuppression (H)     Vitamin D deficiency     Chronic kidney disease, stage 3a (H)     Aftercare following organ transplant     Need for pneumocystis prophylaxis     GERD (gastroesophageal reflux disease)       Allergies  No Known Allergies    Medications  Current Outpatient Medications   Medication Sig Dispense Refill     losartan (COZAAR) 25 MG tablet Take 1 tablet (25 mg) by mouth daily 90 tablet 3     metoprolol tartrate (LOPRESSOR) 100 MG tablet Take 100 mg by mouth 2 times daily.       mycophenolate (GENERIC EQUIVALENT) 250 MG capsule Take 2 capsules (500 mg) by mouth 2 times daily 360 capsule 3     Omega-3 Fatty Acids (FISH OIL) 1000 MG CPDR Take 2 capsules by mouth daily       omeprazole (PRILOSEC) 20 MG capsule Take 40 mg by mouth daily         "sulfamethoxazole-trimethoprim (BACTRIM) 400-80 MG tablet Take 1 tablet by mouth daily 90 tablet 3     tacrolimus (GENERIC) 0.5 MG capsule TAKE TWO CAPSULES BY MOUTH IN THE MORNING AND ONE IN THE EVENING 90 capsule 3     VITAMIN D3 25 MCG (1000 UT) tablet Take 1 tablet (25 mcg) by mouth daily 90 tablet 0     No current facility-administered medications for this visit.     There are no discontinued medications.    Physical Exam  Vital Signs: Ht 1.676 m (5' 6\")   Wt 74.8 kg (165 lb)   BMI 26.63 kg/m      GENERAL: alert and no distress  EYES: Eyes grossly normal to inspection.  No discharge or erythema, or obvious scleral/conjunctival abnormalities.  RESP: No audible wheeze, cough, or visible cyanosis.    SKIN: Visible skin clear. No significant rash, abnormal pigmentation or lesions.  NEURO: Cranial nerves grossly intact.  Mentation and speech appropriate for age.  PSYCH: Appropriate affect, tone, and pace of words      Data        Latest Ref Rng & Units 10/1/2024     8:09 AM 8/1/2024     8:00 AM 12/20/2023     8:18 AM   Renal   Na (external) 136 - 145 mmol/L 138  140  139  C      K (external) 3.4 - 5.1 mmol/L 3.9  4.0  4.0  C      Cl 101 - 112 mmol/L 109  109  106  C      Cl (external) 101 - 112 mmol/L 109  109  106  C      CO2 (external) 20 - 31 mmol/L 26  23  21  C      BUN (external) 9 - 23 mg/dL 15  12  12  C      Cr (external) 0.70 - 1.30 mg/dL 1.73  1.45  1.27  C      Glucose (external) 74 - 106 mg/dL 97  112  110  C      Ca (external) 8.7 - 10.4 mg/dL 9.9  9.8  9.3  C         C Corrected result    This result is from an external source.         Latest Ref Rng & Units 11/28/2022     7:38 AM 4/14/2014     7:20 AM 3/27/2014     7:40 AM   Bone Health   Phosphorus 2.5 - 4.5 mg/dL  3.5     Parathyroid Hormone Intact 12 - 72 pg/mL   79    Vit D Def 30 - 75 ug/L   28    Vit D Def (external) 30.0 - 100.0 ng/mL 17.0             This result is from an external source.         Latest Ref Rng & Units 10/1/2024     8:09 AM " 8/1/2024     8:00 AM 12/20/2023     8:18 AM   Heme   WBC (external) 3.50 - 11.50 K/uL 6.40  8.17  7.32  C      Hgb (external) 14.0 - 18.0 g/dL 15.8  15.8  15.0  C      Plt (external) 150 - 400 K/uL 203  216  203  C      ABSOLUTE NEUTROPHILS (EXTERNAL) 2.00 - 8.00 K/uL 3.53  4.70     4.20  C      ABSOLUTE LYMPHOCYTES (EXTERNAL) 0.60 - 5.20 K/UL 2.10  2.51     2.28  C      ABSOLUTE MONOCYTES (EXTERNAL) 0.00 - 1.00 K/UL 0.61  0.74     0.65  C      ABSOLUTE EOSINOPHILS (EXTERNAL) 0.00 - 0.80 K/uL 0.06  0.08     0.10  C      ABSOLUTE BASOPHILS (EXTERNAL) 0.00 - 0.20 K/UL 0.02  0.02     0.02  C         C Corrected result    This result is from an external source.         Latest Ref Rng & Units 2/15/2018     8:10 AM 3/31/2017     7:47 AM 6/28/2016    12:00 AM   Liver   AP (external) 50 - 136 U/L 65     80     67       TBili (external) 0.2 - 1.0 MG/DL 0.4     0.6     0.5       ALT (external) 16 - 61 U/L 31     33     43       AST (external) 15 - 37 U/L 25     17     24       Tot Protein (external) 6.4 - 8.2 G/DL 7.3     7.3     7.1       Albumin (external) 3.4 - 5.0 G/DL 4.0     4.0     3.9           This result is from an external source.            Latest Ref Rng & Units 3/27/2014     7:40 AM   Iron studies   Iron 35 - 180 ug/dL 43    Iron Saturation Index 15 - 46 % 14          Latest Ref Rng & Units 9/3/2019     8:06 AM 2/20/2019     8:30 AM 2/15/2018     8:10 AM   [facility] T[facility] Virology   BK Quant Log Ext log copies/mL <2.6     <2.6     <2.6       BK Quant Result Ext Copies/mL <390     <390     <390       BK Quant Spec Ext    BLOOD           This result is from an external source.     Failed to redirect to the Timeline version of the REVFS SmartLink.              Again, thank you for allowing me to participate in the care of your patient.        Sincerely,        Antonio Hooks MD

## 2024-10-08 NOTE — NURSING NOTE
Current patient location:  [place]    Is the patient currently in the state of [place]? YES    Visit mode:VIDEO    If the visit is dropped, the patient can be reconnected by: VIDEO VISIT: Send to e-mail at: [email or web address]    Will anyone else be joining the visit? NO  (If patient encounters technical issues they should call [phone]    Are changes needed to the allergy or medication list? No    Are refills needed on medications prescribed by this physician? NO    Rooming Documentation:  Questionnaire(s) completed    Reason for visit: JAM GREENE

## 2024-10-10 ENCOUNTER — MYC MEDICAL ADVICE (OUTPATIENT)
Dept: TRANSPLANT | Facility: CLINIC | Age: 64
End: 2024-10-10

## 2024-10-10 DIAGNOSIS — Z94.0 S/P KIDNEY TRANSPLANT: Primary | ICD-10-CM

## 2024-10-10 NOTE — TELEPHONE ENCOUNTER
Antonio Bright MD Sveiven, Sara, RN  Moris Dallas,    I've asked Mr. Souza to repeat labs this week. We should due regular CBC, BMP and Tacrolimus level. Add DSA, alloruse, urinalysis and UPCR.    Thank you,  Antonio DAN TASK:  Please complete documentation for prospera to be drawn outside of [facility].  My chart sent to patient     Orders faxed with exception of prospera.    Celena Page RN   Transplant Coordinator  [phone]

## 2024-10-10 NOTE — LETTER
OUTPATIENT LABORATORY TEST ORDER     Patient Name: Christiano Souza   YOB: 1960     [facility] MR# [if applicable]: [id number]   Date & Time: 10/10/2024 1455  Expiration Date: 1 year after date issued      Diagnoses: Kidney Transplant (ICD-10 Z94.0)   Long term use of medications (ICD-10 Z79.899)             Contact with or exposure to viral disease (Z20.828)            Aftercare following organ transplant (Z48.298)   Other specified postprocedural states (Z98.890)     We ask your assistance in obtaining the following laboratory tests, which are part of our routine surveillance program for Solid Organ Transplant patients.     Please fax each result to [phone], same day as resulted/available    Critical lab results page [phone]    Please obtain the follow labs:  BMP  CBC  Tacrolimus 12 hour trough   UA with reflex to culture  Urine protein creatinine ratio- random  Donor specific antibodies-  kit provided by patient.        Every 3 months  CBC with platelets  Basic Metabolic Panel (Sodium, Potassium, Chloride, Creatinine, CO2, Urea Nitrogen, glucose, Calcium)  Tacrolimus drug level - 12-hour trough, please document time of last dose      Yearly  Urine for protein/creatinine       If you have any questions, please call [facility] [phone] or [phone], Fax [phone].    .  Antonio Hooks M.D   of Medicine  Nephrology and Hypertension  Department of Medicine

## 2024-10-19 ENCOUNTER — TELEPHONE (OUTPATIENT)
Dept: TRANSPLANT | Facility: CLINIC | Age: 64
End: 2024-10-19

## 2024-10-19 NOTE — TELEPHONE ENCOUNTER
Shanice, [employer] paged and returned call and spoke with Traci at [facility].    [facility] was upset that did not have reference number to go off, but it was not paged to this writer.     Per traci the reference number is as follows    [id number] 10/17 collected 824 am.    Tacrolimus level 3.3     Requested they fax to the transplant center and they have 507 area code for fax. Requested that they fax to the transplant center as well.    Pt's goal is 3-5 per notes 10/8/24. No dose adjustment made.

## 2024-12-30 DIAGNOSIS — Z94.0 KIDNEY REPLACED BY TRANSPLANT: ICD-10-CM

## 2024-12-30 DIAGNOSIS — E55.9 VITAMIN D DEFICIENCY: ICD-10-CM

## 2024-12-30 DIAGNOSIS — Z29.89 NEED FOR PNEUMOCYSTIS PROPHYLAXIS: ICD-10-CM

## 2024-12-30 DIAGNOSIS — Z94.0 HTN, KIDNEY TRANSPLANT RELATED: ICD-10-CM

## 2024-12-30 DIAGNOSIS — I15.1 HTN, KIDNEY TRANSPLANT RELATED: ICD-10-CM

## 2024-12-30 RX ORDER — MYCOPHENOLATE MOFETIL 250 MG/1
500 CAPSULE ORAL 2 TIMES DAILY
Qty: 360 CAPSULE | Refills: 0 | Status: SHIPPED | OUTPATIENT
Start: 2024-12-30

## 2025-02-17 ENCOUNTER — MYC REFILL (OUTPATIENT)
Dept: NEPHROLOGY | Facility: CLINIC | Age: 65
End: 2025-02-17

## 2025-02-17 DIAGNOSIS — Z94.0 KIDNEY REPLACED BY TRANSPLANT: ICD-10-CM

## 2025-02-17 DIAGNOSIS — Z29.89 NEED FOR PNEUMOCYSTIS PROPHYLAXIS: ICD-10-CM

## 2025-02-17 DIAGNOSIS — I15.1 HTN, KIDNEY TRANSPLANT RELATED: ICD-10-CM

## 2025-02-17 DIAGNOSIS — Z94.0 HTN, KIDNEY TRANSPLANT RELATED: ICD-10-CM

## 2025-02-17 DIAGNOSIS — E55.9 VITAMIN D DEFICIENCY: ICD-10-CM

## 2025-02-17 RX ORDER — TACROLIMUS 0.5 MG/1
CAPSULE ORAL
Qty: 90 CAPSULE | Refills: 3 | Status: SHIPPED | OUTPATIENT
Start: 2025-02-17

## 2025-03-09 ENCOUNTER — HEALTH MAINTENANCE LETTER (OUTPATIENT)
Age: 65
End: 2025-03-09

## 2025-03-20 ENCOUNTER — MYC REFILL (OUTPATIENT)
Dept: NEPHROLOGY | Facility: CLINIC | Age: 65
End: 2025-03-20

## 2025-03-20 DIAGNOSIS — Z94.0 HTN, KIDNEY TRANSPLANT RELATED: ICD-10-CM

## 2025-03-20 DIAGNOSIS — I15.1 HTN, KIDNEY TRANSPLANT RELATED: ICD-10-CM

## 2025-03-20 DIAGNOSIS — Z94.0 KIDNEY REPLACED BY TRANSPLANT: ICD-10-CM

## 2025-03-20 DIAGNOSIS — Z29.89 NEED FOR PNEUMOCYSTIS PROPHYLAXIS: ICD-10-CM

## 2025-03-20 DIAGNOSIS — E55.9 VITAMIN D DEFICIENCY: ICD-10-CM

## 2025-03-20 RX ORDER — SULFAMETHOXAZOLE AND TRIMETHOPRIM 400; 80 MG/1; MG/1
1 TABLET ORAL DAILY
Qty: 90 TABLET | Refills: 3 | Status: SHIPPED | OUTPATIENT
Start: 2025-03-20

## 2025-04-02 ENCOUNTER — TELEPHONE (OUTPATIENT)
Dept: TRANSPLANT | Facility: CLINIC | Age: 65
End: 2025-04-02

## 2025-04-02 DIAGNOSIS — Z94.0 KIDNEY REPLACED BY TRANSPLANT: ICD-10-CM

## 2025-04-02 DIAGNOSIS — I15.1 HTN, KIDNEY TRANSPLANT RELATED: ICD-10-CM

## 2025-04-02 DIAGNOSIS — Z29.89 NEED FOR PNEUMOCYSTIS PROPHYLAXIS: ICD-10-CM

## 2025-04-02 DIAGNOSIS — Z94.0 HTN, KIDNEY TRANSPLANT RELATED: ICD-10-CM

## 2025-04-02 DIAGNOSIS — Z94.0 KIDNEY REPLACED BY TRANSPLANT: Primary | ICD-10-CM

## 2025-04-02 DIAGNOSIS — E55.9 VITAMIN D DEFICIENCY: ICD-10-CM

## 2025-04-02 RX ORDER — MYCOPHENOLATE MOFETIL 250 MG/1
500 CAPSULE ORAL 2 TIMES DAILY
Qty: 360 CAPSULE | Refills: 0 | Status: SHIPPED | OUTPATIENT
Start: 2025-04-02

## 2025-04-02 NOTE — TELEPHONE ENCOUNTER
Patient Call: General  Route to LPN    Reason for call: patient is needing some additional information of his intial transplant process to be able to get insurance coverage and request refill for Medicare Part B. Patient has some documents to email coordinator and needs their email.  More details with call back.      Call back needed? Yes    Return Call Needed  Same as documented in contacts section  When to return call?: Same day: Route High Priority

## 2025-04-03 RX ORDER — TACROLIMUS 0.5 MG/1
CAPSULE ORAL
Qty: 90 CAPSULE | Refills: 2 | Status: SHIPPED | OUTPATIENT
Start: 2025-04-03 | End: 2025-04-03

## 2025-04-03 RX ORDER — TACROLIMUS 0.5 MG/1
CAPSULE ORAL
Qty: 90 CAPSULE | Refills: 2 | Status: SHIPPED | OUTPATIENT
Start: 2025-04-03

## 2025-04-03 NOTE — TELEPHONE ENCOUNTER
Discussed with patient [facility] SOT expectations with labs and annual clinic apt.  Patient is overdue for labs, last lab on file: 10/2024.  Pt states he will have labs drawn within the next 1-2 weeks.  Order placed for annual follow up Fall 2025, patient aware.    Celena Page RN   Transplant Coordinator  [phone]

## 2025-04-03 NOTE — TELEPHONE ENCOUNTER
Patient had Medicare at time of transplant. Medication should be billed to medicare part B    TRANSPLANT:  kidney   DATE: 03/20/2014    MEDICARE ID# [id number]    MEDICARE PART A EFFECTIVE DATES:9/1/2013-3/31/2017 then reactive again 03/01/2025  MEDICARE PART B EFFECTIVE DATES: 9/1/2013-3/31/2017 then reactive again 03/01/2025      Pharmacy with [facility] is stated that they need:  HARD COPY of the rx and i needs to be WET Signed by the provider. they also want the transplant DX Code on the hard copay, the transplant date, and discharge date and where this took place

## 2025-04-03 NOTE — TELEPHONE ENCOUNTER
ISSUE:   Pharmacy with [facility] is stated that they need:  HARD COPY of the rx and i needs to be WET Signed by the provider. they also want the transplant DX Code on the hard copay, the transplant date, and discharge date and where this took place. Prescribing provider is currently out, will be returning next week.    PLAN/OUTCOME:  Will follow up with patient and have hard copy per pharmacy request sent early next week.  Patient states he has supply for ~1 week.    Celena Page RN   Transplant Coordinator  [phone]